# Patient Record
Sex: MALE | Race: WHITE | HISPANIC OR LATINO | Employment: FULL TIME | URBAN - METROPOLITAN AREA
[De-identification: names, ages, dates, MRNs, and addresses within clinical notes are randomized per-mention and may not be internally consistent; named-entity substitution may affect disease eponyms.]

---

## 2020-11-12 ENCOUNTER — OFFICE VISIT (OUTPATIENT)
Dept: URGENT CARE | Facility: PHYSICIAN GROUP | Age: 45
End: 2020-11-12

## 2020-11-12 ENCOUNTER — HOSPITAL ENCOUNTER (OUTPATIENT)
Dept: RADIOLOGY | Facility: MEDICAL CENTER | Age: 45
End: 2020-11-12
Attending: PHYSICIAN ASSISTANT

## 2020-11-12 VITALS
DIASTOLIC BLOOD PRESSURE: 90 MMHG | HEIGHT: 66 IN | WEIGHT: 155 LBS | BODY MASS INDEX: 24.91 KG/M2 | RESPIRATION RATE: 17 BRPM | HEART RATE: 91 BPM | OXYGEN SATURATION: 97 % | SYSTOLIC BLOOD PRESSURE: 140 MMHG | TEMPERATURE: 97.9 F

## 2020-11-12 DIAGNOSIS — Z85.47 PERSONAL HISTORY OF MALIGNANT NEOPLASM OF TESTIS: ICD-10-CM

## 2020-11-12 DIAGNOSIS — N50.89 SCROTAL SWELLING: ICD-10-CM

## 2020-11-12 DIAGNOSIS — N50.89 MASS OF RIGHT TESTICLE: ICD-10-CM

## 2020-11-12 PROCEDURE — 99204 OFFICE O/P NEW MOD 45 MIN: CPT | Performed by: PHYSICIAN ASSISTANT

## 2020-11-12 PROCEDURE — 76870 US EXAM SCROTUM: CPT

## 2020-11-12 ASSESSMENT — ENCOUNTER SYMPTOMS
SHORTNESS OF BREATH: 0
COUGH: 0
FLANK PAIN: 0
VOMITING: 0
AFFECTED TESTICLE: 1
ABDOMINAL PAIN: 0
FEVER: 0
PALPITATIONS: 0
CHILLS: 0
BACK PAIN: 0
NAUSEA: 0

## 2020-11-12 ASSESSMENT — PAIN SCALES - GENERAL: PAINLEVEL: NO PAIN

## 2020-11-12 NOTE — PROGRESS NOTES
"Subjective:   Charanjit Morin is a 44 y.o. male who presents for Other (testicle lump right side x 2 months)      Testicle Pain  This is a new problem. Episode onset: 2 months ago. The problem occurs constantly. Pertinent negatives include no abdominal pain, chest pain, chills, coughing, fever, nausea or vomiting. Associated symptoms comments: Swelling  . Nothing aggravates the symptoms. He has tried nothing for the symptoms.       Review of Systems   Constitutional: Negative for chills and fever.   Respiratory: Negative for cough and shortness of breath.    Cardiovascular: Negative for chest pain and palpitations.   Gastrointestinal: Negative for abdominal pain, nausea and vomiting.   Genitourinary: Negative for dysuria, flank pain, frequency, hematuria and urgency.        Scrotal swelling     Musculoskeletal: Negative for back pain.   All other systems reviewed and are negative.      Medications:    • This patient does not have an active medication from one of the medication groupers.    Allergies: Patient has no known allergies.    Problem List: Charanjit Morin does not have a problem list on file.    Surgical History:  No past surgical history on file.    Past Social Hx: Charanjit Morin  reports that he has been smoking. He has never used smokeless tobacco.     Past Family Hx:  Charanjit Morin family history is not on file.     Problem list, medications, and allergies reviewed by myself today in Epic.     Objective:     Blood Pressure 140/90   Pulse 91   Temperature 36.6 °C (97.9 °F)   Respiration 17   Height 1.676 m (5' 6\")   Weight 70.3 kg (155 lb)   Oxygen Saturation 97%   Body Mass Index 25.02 kg/m²     Physical Exam  Vitals signs reviewed.   Constitutional:       General: He is not in acute distress.     Appearance: He is well-developed. He is not ill-appearing, toxic-appearing or diaphoretic.   Cardiovascular:      Rate and Rhythm: Normal rate and regular rhythm.      Heart sounds: Normal heart sounds. "   Pulmonary:      Effort: Pulmonary effort is normal.      Breath sounds: Normal breath sounds.   Abdominal:      General: Bowel sounds are normal. There is no distension.      Palpations: Abdomen is soft. There is no mass.      Tenderness: There is no abdominal tenderness. There is no guarding or rebound.      Hernia: No hernia is present.   Genitourinary:     Penis: Normal. No phimosis, paraphimosis, erythema or tenderness.       Testes: Cremasteric reflex is present.         Right: Mass not present. Right testis is descended.         Left: Mass not present. Left testis is descended.      Comments: Neg Prehn's sign.  No bell-clapper deformity.  Soft testicle to palpation.  Edema of the right testicle  Skin:     Findings: No erythema or rash.   Neurological:      Mental Status: He is alert and oriented to person, place, and time.   Psychiatric:         Behavior: Behavior normal.         Thought Content: Thought content normal.         Judgment: Judgment normal.     11/12/2020 4:17 PM     HISTORY/REASON FOR EXAM: Left orchiectomy several years ago due to malignancy. Growing mass and enlargement of the right testicle for 2 months.     TECHNIQUE/EXAM DESCRIPTION:  Real-time sonography of the scrotum was performed with gray-scale, color and duplex Doppler imaging.     COMPARISON: None     FINDINGS:  The right testicle is globular in shape with a heterogeneous hypoechoic mass centrally containing some internal calcification measuring 2.9 x 2.2 x 1.3 cm. This appears to extrude about the medial aspect of the right mid testicle which is lobulated in   appearance. It is hypervascular.     The right testis measures 3.15 cm x 1.88 cm x 3.43 cm.  The left testis is surgically absent.     The right epididymis is normal with the head measuring 7 mm.     There is a right hydrocele.     No varicocele is detected.     IMPRESSION:     1.  There is a heterogeneous hypervascular globular mass within a lobulated abnormally shaped  right testicle. This is suspicious for malignancy.    Assessment/Plan:     Medical Decision Making/Comments     Pain is a 44-year-old malewho presents for evaluation of testicular pain.  Pt states for the last 2 months he has had a rate testicular swelling.  History of testicular cancer 20 years ago with removal of the left testicle. 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 Pt denies red flag symptoms of trauma, N/V, abominal pain.  Pt is sexually active.  Vital sign normal.  Testicular exam shows edema of the right testicle.  Soft  firm testicle with no bell clapper deformity seen.  Cremaster reflex is present and negative Prehns sign.  US shows mass suspicious for malignancy.  Urgent referral sent to urology.    Diagnosis differential includes but not limited to: vericocele, hydrocele spermatocele, epididymitis, hernia, soft tissue infection, testicular torsion, mass/malignancy,      Diagnosis and associated orders     1. Scrotal swelling  [id number]    REFERRAL TO UROLOGY              Differential diagnosis, natural history, supportive care, and indications for immediate follow-up discussed.    Advised the patient to follow-up with the primary care physician for recheck, reevaluation, and consideration of further management.    Please note that this dictation was created using voice recognition software. I have made a reasonable attempt to correct obvious errors, but I expect that there are errors of grammar and possibly content that I did not discover before finalizing the note.

## 2020-11-12 NOTE — PATIENT INSTRUCTIONS
Hydrocele, Adult  A hydrocele is a collection of fluid in the loose pouch of skin that holds the testicles (scrotum). This may happen because:  · The amount of fluid produced in the scrotum is not absorbed by the rest of the body.  · Fluid from the abdomen fills the scrotum. Normally, the testicles develop in the abdomen then move (drop) into to the scrotum before birth. The tube that the testicles travel through usually closes after the testicles drop. If the tube does not close, fluid from the abdomen can fill the scrotum. This is less common in adults.  What are the causes?  The cause of a hydrocele in adults is usually not known. However, it may be caused by:  · An injury to the scrotum.  · An infection (epididymitis).  · Decreased blood flow to the scrotum.  · Twisting of a testicle (testicular torsion).  · A birth defect.  · A tumor or cancer of the testicle.  What are the signs or symptoms?  A hydrocele feels like a water-filled balloon. It may also feel heavy. Other symptoms include:  · Swelling of the scrotum. The swelling may decrease when you lie down. You may also notice more swelling at night than in the morning.  · Swelling of the groin.  · Mild discomfort in the scrotum.  · Pain. This can develop if the hydrocele was caused by infection or twisting. The larger the hydrocele, the more likely you are to have pain.  How is this diagnosed?  This condition may be diagnosed based on:  · Physical exam.  · Medical history.  You may also have other tests, including:  · Imaging tests, such as ultrasound.  · Blood or urine tests.  How is this treated?  Most hydroceles go away on their own. If you have no discomfort or pain, your health care provider may suggest close monitoring of your condition (called watch and wait or watchful waiting) until the condition goes away or symptoms develop. If treatment is needed, it may include:  · Treating an underlying condition. This may include using an antibiotic medicine to  treat an infection.  · Surgery to stop fluid from collecting in the scrotum.  · Surgery to drain the fluid. Options include:  ? Needle aspiration. A needle is used to drain fluid. However, the fluid buildup will come back quickly.  ? Hydrocelectomy. For this procedure, an incision is made in the scrotum to remove the fluid sac.  Follow these instructions at home:  · Watch the hydrocele for any changes.  · Take over-the-counter and prescription medicines only as told by your health care provider.  · If you were prescribed an antibiotic medicine, use it as told by your health care provider. Do not stop taking the antibiotic even if you start to feel better.  · Keep all follow-up visits as told by your health care provider. This is important.  Contact a health care provider if:  · You notice any changes in the hydrocele.  · The swelling in your scrotum or groin gets worse.  · The hydrocele becomes red, firm, painful, or tender to the touch.  · You have a fever.  Get help right away if you:  · Develop a lot of pain, or your pain becomes worse.  Summary  · A hydrocele is a collection of fluid in the loose pouch of skin that holds the testicles (scrotum).  · Hydroceles can cause swelling, discomfort, and sometimes pain.  · In adults, the cause of a hydrocele usually is not known. However, it is sometimes caused by an infection or a rotation and twisting of the scrotum.  · Treatment is usually not needed. Hydroceles often go away on their own. If a hydrocele causes pain, treatment may be given to ease the pain.  This information is not intended to replace advice given to you by your health care provider. Make sure you discuss any questions you have with your health care provider.  Document Released: 06/07/2011 Document Revised: 12/29/2018 Document Reviewed: 12/29/2018  [employer] Patient Education © 2020 [employer]

## 2020-11-17 ENCOUNTER — HOSPITAL ENCOUNTER (OUTPATIENT)
Dept: RADIOLOGY | Facility: MEDICAL CENTER | Age: 45
End: 2020-11-17
Attending: UROLOGY

## 2020-11-17 ENCOUNTER — HOSPITAL ENCOUNTER (OUTPATIENT)
Dept: LAB | Facility: MEDICAL CENTER | Age: 45
End: 2020-11-17
Attending: UROLOGY

## 2020-11-17 DIAGNOSIS — N50.89 CHYLOCELE OF TUNICA VAGINALIS: ICD-10-CM

## 2020-11-17 LAB
ALBUMIN SERPL BCP-MCNC: 4.2 G/DL (ref 3.2–4.9)
ALBUMIN/GLOB SERPL: 1.3 G/DL
ALP SERPL-CCNC: 144 U/L (ref 30–99)
ALT SERPL-CCNC: 63 U/L (ref 2–50)
ANION GAP SERPL CALC-SCNC: 10 MMOL/L (ref 7–16)
AST SERPL-CCNC: 45 U/L (ref 12–45)
B-HCG SERPL-ACNC: 202 MIU/ML (ref 0–5)
BASOPHILS # BLD AUTO: 0.4 % (ref 0–1.8)
BASOPHILS # BLD: 0.05 K/UL (ref 0–0.12)
BILIRUB SERPL-MCNC: 0.2 MG/DL (ref 0.1–1.5)
BUN SERPL-MCNC: 12 MG/DL (ref 8–22)
CALCIUM SERPL-MCNC: 9.4 MG/DL (ref 8.5–10.5)
CHLORIDE SERPL-SCNC: 97 MMOL/L (ref 96–112)
CO2 SERPL-SCNC: 22 MMOL/L (ref 20–33)
CREAT SERPL-MCNC: 0.9 MG/DL (ref 0.5–1.4)
EOSINOPHIL # BLD AUTO: 0.08 K/UL (ref 0–0.51)
EOSINOPHIL NFR BLD: 0.7 % (ref 0–6.9)
ERYTHROCYTE [DISTWIDTH] IN BLOOD BY AUTOMATED COUNT: 51.2 FL (ref 35.9–50)
GLOBULIN SER CALC-MCNC: 3.3 G/DL (ref 1.9–3.5)
GLUCOSE SERPL-MCNC: 101 MG/DL (ref 65–99)
HCT VFR BLD AUTO: 44.6 % (ref 42–52)
HGB BLD-MCNC: 15 G/DL (ref 14–18)
IMM GRANULOCYTES # BLD AUTO: 0.05 K/UL (ref 0–0.11)
IMM GRANULOCYTES NFR BLD AUTO: 0.4 % (ref 0–0.9)
LDH SERPL L TO P-CCNC: 199 U/L (ref 107–266)
LYMPHOCYTES # BLD AUTO: 2.95 K/UL (ref 1–4.8)
LYMPHOCYTES NFR BLD: 24.1 % (ref 22–41)
MCH RBC QN AUTO: 32.7 PG (ref 27–33)
MCHC RBC AUTO-ENTMCNC: 33.6 G/DL (ref 33.7–35.3)
MCV RBC AUTO: 97.2 FL (ref 81.4–97.8)
MONOCYTES # BLD AUTO: 1 K/UL (ref 0–0.85)
MONOCYTES NFR BLD AUTO: 8.2 % (ref 0–13.4)
NEUTROPHILS # BLD AUTO: 8.13 K/UL (ref 1.82–7.42)
NEUTROPHILS NFR BLD: 66.2 % (ref 44–72)
NRBC # BLD AUTO: 0 K/UL
NRBC BLD-RTO: 0 /100 WBC
PLATELET # BLD AUTO: 371 K/UL (ref 164–446)
PMV BLD AUTO: 11.8 FL (ref 9–12.9)
POTASSIUM SERPL-SCNC: 3.5 MMOL/L (ref 3.6–5.5)
PROT SERPL-MCNC: 7.5 G/DL (ref 6–8.2)
RBC # BLD AUTO: 4.59 M/UL (ref 4.7–6.1)
SODIUM SERPL-SCNC: 129 MMOL/L (ref 135–145)
WBC # BLD AUTO: 12.3 K/UL (ref 4.8–10.8)

## 2020-11-17 PROCEDURE — 80053 COMPREHEN METABOLIC PANEL: CPT

## 2020-11-17 PROCEDURE — 700117 HCHG RX CONTRAST REV CODE 255: Performed by: UROLOGY

## 2020-11-17 PROCEDURE — 36415 COLL VENOUS BLD VENIPUNCTURE: CPT

## 2020-11-17 PROCEDURE — 71260 CT THORAX DX C+: CPT

## 2020-11-17 PROCEDURE — 82105 ALPHA-FETOPROTEIN SERUM: CPT

## 2020-11-17 PROCEDURE — 83615 LACTATE (LD) (LDH) ENZYME: CPT

## 2020-11-17 PROCEDURE — 74178 CT ABD&PLV WO CNTR FLWD CNTR: CPT

## 2020-11-17 PROCEDURE — 84702 CHORIONIC GONADOTROPIN TEST: CPT

## 2020-11-17 PROCEDURE — 85025 COMPLETE CBC W/AUTO DIFF WBC: CPT

## 2020-11-17 RX ADMIN — IOHEXOL 25 ML: 240 INJECTION, SOLUTION INTRATHECAL; INTRAVASCULAR; INTRAVENOUS; ORAL at 15:17

## 2020-11-17 RX ADMIN — IOHEXOL 100 ML: 350 INJECTION, SOLUTION INTRAVENOUS at 16:15

## 2020-11-19 LAB — AFP-TM SERPL-MCNC: 6 NG/ML (ref 0–9)

## 2020-11-20 ENCOUNTER — APPOINTMENT (OUTPATIENT)
Dept: ADMISSIONS | Facility: MEDICAL CENTER | Age: 45
End: 2020-11-20
Attending: UROLOGY

## 2020-11-20 ENCOUNTER — PRE-ADMISSION TESTING (OUTPATIENT)
Dept: ADMISSIONS | Facility: MEDICAL CENTER | Age: 45
End: 2020-11-20
Attending: UROLOGY

## 2020-11-20 DIAGNOSIS — Z01.812 PRE-OPERATIVE LABORATORY EXAMINATION: ICD-10-CM

## 2020-11-20 LAB — COVID ORDER STATUS COVID19: NORMAL

## 2020-11-20 PROCEDURE — U0003 INFECTIOUS AGENT DETECTION BY NUCLEIC ACID (DNA OR RNA); SEVERE ACUTE RESPIRATORY SYNDROME CORONAVIRUS 2 (SARS-COV-2) (CORONAVIRUS DISEASE [COVID-19]), AMPLIFIED PROBE TECHNIQUE, MAKING USE OF HIGH THROUGHPUT TECHNOLOGIES AS DESCRIBED BY CMS-2020-01-R: HCPCS

## 2020-11-20 PROCEDURE — C9803 HOPD COVID-19 SPEC COLLECT: HCPCS

## 2020-11-21 LAB
SARS-COV-2 RNA RESP QL NAA+PROBE: NOTDETECTED
SPECIMEN SOURCE: NORMAL

## 2020-11-22 ENCOUNTER — HOSPITAL ENCOUNTER (OUTPATIENT)
Facility: MEDICAL CENTER | Age: 45
End: 2020-11-22
Attending: UROLOGY | Admitting: UROLOGY

## 2020-11-22 ENCOUNTER — ANESTHESIA (OUTPATIENT)
Dept: SURGERY | Facility: MEDICAL CENTER | Age: 45
End: 2020-11-22

## 2020-11-22 ENCOUNTER — ANESTHESIA EVENT (OUTPATIENT)
Dept: SURGERY | Facility: MEDICAL CENTER | Age: 45
End: 2020-11-22

## 2020-11-22 VITALS
BODY MASS INDEX: 24.84 KG/M2 | HEART RATE: 78 BPM | TEMPERATURE: 97.6 F | DIASTOLIC BLOOD PRESSURE: 80 MMHG | RESPIRATION RATE: 14 BRPM | SYSTOLIC BLOOD PRESSURE: 118 MMHG | OXYGEN SATURATION: 94 % | WEIGHT: 154.54 LBS | HEIGHT: 66 IN

## 2020-11-22 DIAGNOSIS — C62.11 MALIGNANT NEOPLASM OF DESCENDED RIGHT TESTIS (HCC): Primary | ICD-10-CM

## 2020-11-22 PROBLEM — C62.90 MALIGNANT TUMOR OF TESTIS (HCC): Status: ACTIVE | Noted: 2020-11-22

## 2020-11-22 PROCEDURE — 700111 HCHG RX REV CODE 636 W/ 250 OVERRIDE (IP): Performed by: UROLOGY

## 2020-11-22 PROCEDURE — A9270 NON-COVERED ITEM OR SERVICE: HCPCS | Performed by: UROLOGY

## 2020-11-22 PROCEDURE — 700105 HCHG RX REV CODE 258: Performed by: UROLOGY

## 2020-11-22 PROCEDURE — 700111 HCHG RX REV CODE 636 W/ 250 OVERRIDE (IP): Performed by: ANESTHESIOLOGY

## 2020-11-22 PROCEDURE — 160025 RECOVERY II MINUTES (STATS): Performed by: UROLOGY

## 2020-11-22 PROCEDURE — 700102 HCHG RX REV CODE 250 W/ 637 OVERRIDE(OP): Performed by: UROLOGY

## 2020-11-22 PROCEDURE — 160036 HCHG PACU - EA ADDL 30 MINS PHASE I: Performed by: UROLOGY

## 2020-11-22 PROCEDURE — 160009 HCHG ANES TIME/MIN: Performed by: UROLOGY

## 2020-11-22 PROCEDURE — 501838 HCHG SUTURE GENERAL: Performed by: UROLOGY

## 2020-11-22 PROCEDURE — 160028 HCHG SURGERY MINUTES - 1ST 30 MINS LEVEL 3: Performed by: UROLOGY

## 2020-11-22 PROCEDURE — A9270 NON-COVERED ITEM OR SERVICE: HCPCS | Performed by: ANESTHESIOLOGY

## 2020-11-22 PROCEDURE — 88341 IMHCHEM/IMCYTCHM EA ADD ANTB: CPT | Mod: 91

## 2020-11-22 PROCEDURE — 500383 HCHG DRAIN, PENROSE 3/8X12: Performed by: UROLOGY

## 2020-11-22 PROCEDURE — 700101 HCHG RX REV CODE 250: Performed by: ANESTHESIOLOGY

## 2020-11-22 PROCEDURE — 160002 HCHG RECOVERY MINUTES (STAT): Performed by: UROLOGY

## 2020-11-22 PROCEDURE — 88309 TISSUE EXAM BY PATHOLOGIST: CPT

## 2020-11-22 PROCEDURE — 160039 HCHG SURGERY MINUTES - EA ADDL 1 MIN LEVEL 3: Performed by: UROLOGY

## 2020-11-22 PROCEDURE — 700102 HCHG RX REV CODE 250 W/ 637 OVERRIDE(OP): Performed by: ANESTHESIOLOGY

## 2020-11-22 PROCEDURE — 700101 HCHG RX REV CODE 250: Performed by: UROLOGY

## 2020-11-22 PROCEDURE — 500002 HCHG ADHESIVE, DERMABOND: Performed by: UROLOGY

## 2020-11-22 PROCEDURE — A6403 STERILE GAUZE>16 <= 48 SQ IN: HCPCS | Performed by: UROLOGY

## 2020-11-22 PROCEDURE — 160046 HCHG PACU - 1ST 60 MINS PHASE II: Performed by: UROLOGY

## 2020-11-22 PROCEDURE — 160035 HCHG PACU - 1ST 60 MINS PHASE I: Performed by: UROLOGY

## 2020-11-22 PROCEDURE — 88342 IMHCHEM/IMCYTCHM 1ST ANTB: CPT

## 2020-11-22 PROCEDURE — 160048 HCHG OR STATISTICAL LEVEL 1-5: Performed by: UROLOGY

## 2020-11-22 RX ORDER — MIDAZOLAM HYDROCHLORIDE 1 MG/ML
INJECTION INTRAMUSCULAR; INTRAVENOUS PRN
Status: DISCONTINUED | OUTPATIENT
Start: 2020-11-22 | End: 2020-11-22 | Stop reason: SURG

## 2020-11-22 RX ORDER — CEFAZOLIN SODIUM 1 G/3ML
INJECTION, POWDER, FOR SOLUTION INTRAMUSCULAR; INTRAVENOUS PRN
Status: DISCONTINUED | OUTPATIENT
Start: 2020-11-22 | End: 2020-11-22 | Stop reason: SURG

## 2020-11-22 RX ORDER — DEXAMETHASONE SODIUM PHOSPHATE 4 MG/ML
INJECTION, SOLUTION INTRA-ARTICULAR; INTRALESIONAL; INTRAMUSCULAR; INTRAVENOUS; SOFT TISSUE PRN
Status: DISCONTINUED | OUTPATIENT
Start: 2020-11-22 | End: 2020-11-22 | Stop reason: SURG

## 2020-11-22 RX ORDER — DOCUSATE SODIUM 100 MG/1
100 CAPSULE, LIQUID FILLED ORAL 2 TIMES DAILY
Qty: 30 CAP | Refills: 0 | COMMUNITY
End: 2020-12-28

## 2020-11-22 RX ORDER — DIPHENHYDRAMINE HYDROCHLORIDE 50 MG/ML
12.5 INJECTION INTRAMUSCULAR; INTRAVENOUS
Status: DISCONTINUED | OUTPATIENT
Start: 2020-11-22 | End: 2020-11-22 | Stop reason: HOSPADM

## 2020-11-22 RX ORDER — HYDROMORPHONE HYDROCHLORIDE 1 MG/ML
0.1 INJECTION, SOLUTION INTRAMUSCULAR; INTRAVENOUS; SUBCUTANEOUS
Status: DISCONTINUED | OUTPATIENT
Start: 2020-11-22 | End: 2020-11-22 | Stop reason: HOSPADM

## 2020-11-22 RX ORDER — KETOROLAC TROMETHAMINE 30 MG/ML
INJECTION, SOLUTION INTRAMUSCULAR; INTRAVENOUS PRN
Status: DISCONTINUED | OUTPATIENT
Start: 2020-11-22 | End: 2020-11-22 | Stop reason: SURG

## 2020-11-22 RX ORDER — OXYCODONE HCL 5 MG/5 ML
10 SOLUTION, ORAL ORAL
Status: COMPLETED | OUTPATIENT
Start: 2020-11-22 | End: 2020-11-22

## 2020-11-22 RX ORDER — LORAZEPAM 2 MG/ML
0.5 INJECTION INTRAMUSCULAR
Status: DISCONTINUED | OUTPATIENT
Start: 2020-11-22 | End: 2020-11-22 | Stop reason: HOSPADM

## 2020-11-22 RX ORDER — LABETALOL HYDROCHLORIDE 5 MG/ML
5 INJECTION, SOLUTION INTRAVENOUS
Status: DISCONTINUED | OUTPATIENT
Start: 2020-11-22 | End: 2020-11-22 | Stop reason: HOSPADM

## 2020-11-22 RX ORDER — HYDROMORPHONE HYDROCHLORIDE 1 MG/ML
0.4 INJECTION, SOLUTION INTRAMUSCULAR; INTRAVENOUS; SUBCUTANEOUS
Status: DISCONTINUED | OUTPATIENT
Start: 2020-11-22 | End: 2020-11-22 | Stop reason: HOSPADM

## 2020-11-22 RX ORDER — ONDANSETRON 2 MG/ML
INJECTION INTRAMUSCULAR; INTRAVENOUS PRN
Status: DISCONTINUED | OUTPATIENT
Start: 2020-11-22 | End: 2020-11-22 | Stop reason: SURG

## 2020-11-22 RX ORDER — SODIUM CHLORIDE, SODIUM LACTATE, POTASSIUM CHLORIDE, CALCIUM CHLORIDE 600; 310; 30; 20 MG/100ML; MG/100ML; MG/100ML; MG/100ML
INJECTION, SOLUTION INTRAVENOUS CONTINUOUS
Status: DISCONTINUED | OUTPATIENT
Start: 2020-11-22 | End: 2020-11-22 | Stop reason: HOSPADM

## 2020-11-22 RX ORDER — HYDROMORPHONE HYDROCHLORIDE 1 MG/ML
0.2 INJECTION, SOLUTION INTRAMUSCULAR; INTRAVENOUS; SUBCUTANEOUS
Status: DISCONTINUED | OUTPATIENT
Start: 2020-11-22 | End: 2020-11-22 | Stop reason: HOSPADM

## 2020-11-22 RX ORDER — LIDOCAINE HYDROCHLORIDE 20 MG/ML
INJECTION, SOLUTION EPIDURAL; INFILTRATION; INTRACAUDAL; PERINEURAL PRN
Status: DISCONTINUED | OUTPATIENT
Start: 2020-11-22 | End: 2020-11-22 | Stop reason: SURG

## 2020-11-22 RX ORDER — BUPIVACAINE HYDROCHLORIDE 2.5 MG/ML
INJECTION, SOLUTION EPIDURAL; INFILTRATION; INTRACAUDAL
Status: DISCONTINUED | OUTPATIENT
Start: 2020-11-22 | End: 2020-11-22 | Stop reason: HOSPADM

## 2020-11-22 RX ORDER — ONDANSETRON 2 MG/ML
4 INJECTION INTRAMUSCULAR; INTRAVENOUS
Status: DISCONTINUED | OUTPATIENT
Start: 2020-11-22 | End: 2020-11-22 | Stop reason: HOSPADM

## 2020-11-22 RX ORDER — POLYETHYLENE GLYCOL 3350 17 G/17G
17 POWDER, FOR SOLUTION ORAL DAILY
Qty: 14 EACH | Refills: 0 | COMMUNITY
End: 2020-12-28

## 2020-11-22 RX ORDER — OXYCODONE HCL 5 MG/5 ML
5 SOLUTION, ORAL ORAL
Status: COMPLETED | OUTPATIENT
Start: 2020-11-22 | End: 2020-11-22

## 2020-11-22 RX ORDER — OXYCODONE HYDROCHLORIDE 5 MG/1
5-10 TABLET ORAL EVERY 6 HOURS PRN
Qty: 15 TAB | Refills: 0 | Status: SHIPPED | OUTPATIENT
Start: 2020-11-22 | End: 2020-11-27

## 2020-11-22 RX ORDER — MEPERIDINE HYDROCHLORIDE 25 MG/ML
12.5 INJECTION INTRAMUSCULAR; INTRAVENOUS; SUBCUTANEOUS
Status: DISCONTINUED | OUTPATIENT
Start: 2020-11-22 | End: 2020-11-22 | Stop reason: HOSPADM

## 2020-11-22 RX ORDER — HALOPERIDOL 5 MG/ML
1 INJECTION INTRAMUSCULAR
Status: DISCONTINUED | OUTPATIENT
Start: 2020-11-22 | End: 2020-11-22 | Stop reason: HOSPADM

## 2020-11-22 RX ADMIN — LIDOCAINE HYDROCHLORIDE 70 MG: 20 INJECTION, SOLUTION EPIDURAL; INFILTRATION; INTRACAUDAL at 08:09

## 2020-11-22 RX ADMIN — SODIUM CHLORIDE, POTASSIUM CHLORIDE, SODIUM LACTATE AND CALCIUM CHLORIDE: 600; 310; 30; 20 INJECTION, SOLUTION INTRAVENOUS at 06:58

## 2020-11-22 RX ADMIN — LIDOCAINE HYDROCHLORIDE 0.5 ML: 10 INJECTION, SOLUTION EPIDURAL; INFILTRATION; INTRACAUDAL; PERINEURAL at 06:58

## 2020-11-22 RX ADMIN — MIDAZOLAM HYDROCHLORIDE 2 MG: 1 INJECTION, SOLUTION INTRAMUSCULAR; INTRAVENOUS at 08:05

## 2020-11-22 RX ADMIN — OXYCODONE HYDROCHLORIDE 10 MG: 5 SOLUTION ORAL at 09:30

## 2020-11-22 RX ADMIN — DEXAMETHASONE SODIUM PHOSPHATE 8 MG: 4 INJECTION, SOLUTION INTRA-ARTICULAR; INTRALESIONAL; INTRAMUSCULAR; INTRAVENOUS; SOFT TISSUE at 08:13

## 2020-11-22 RX ADMIN — FENTANYL CITRATE 50 MCG: 50 INJECTION, SOLUTION INTRAMUSCULAR; INTRAVENOUS at 08:18

## 2020-11-22 RX ADMIN — CEFAZOLIN 2 G: 330 INJECTION, POWDER, FOR SOLUTION INTRAMUSCULAR; INTRAVENOUS at 08:13

## 2020-11-22 RX ADMIN — PROPOFOL 200 MG: 10 INJECTION, EMULSION INTRAVENOUS at 08:09

## 2020-11-22 RX ADMIN — ONDANSETRON 4 MG: 2 INJECTION INTRAMUSCULAR; INTRAVENOUS at 09:04

## 2020-11-22 RX ADMIN — POVIDONE IODINE 15 ML: 100 SOLUTION TOPICAL at 06:58

## 2020-11-22 RX ADMIN — FENTANYL CITRATE 25 MCG: 50 INJECTION, SOLUTION INTRAMUSCULAR; INTRAVENOUS at 09:35

## 2020-11-22 RX ADMIN — FENTANYL CITRATE 50 MCG: 50 INJECTION, SOLUTION INTRAMUSCULAR; INTRAVENOUS at 08:27

## 2020-11-22 RX ADMIN — KETOROLAC TROMETHAMINE 30 MG: 30 INJECTION, SOLUTION INTRAMUSCULAR at 09:04

## 2020-11-22 ASSESSMENT — PAIN DESCRIPTION - PAIN TYPE
TYPE: SURGICAL PAIN

## 2020-11-22 ASSESSMENT — PAIN SCALES - GENERAL: PAIN_LEVEL: 3

## 2020-11-22 ASSESSMENT — FIBROSIS 4 INDEX: FIB4 SCORE: 0.67

## 2020-11-22 NOTE — ANESTHESIA POSTPROCEDURE EVALUATION
Patient: Charanjit Morin    Procedure Summary     Date: 11/22/20 Room / Location: [place] [address] / SURGERY [facility]    Anesthesia Start: 0805 Anesthesia Stop: 0923    Procedure: ORCHIECTOMY - INGUINAL RADICAL (Right Scrotum) Diagnosis: (MALIGNANT TUMOR OF TESTIS)    Surgeons: Arturo Harvey M.D. Responsible Provider: Monica Arango M.D.    Anesthesia Type: general ASA Status: 2          Final Anesthesia Type: general  Last vitals  BP   Blood Pressure: 118/80, NIBP: 149/49    Temp   36.4 °C (97.6 °F)    Pulse   Pulse: 78   Resp   14    SpO2   94 %      Anesthesia Post Evaluation    Patient location during evaluation: PACU  Patient participation: complete - patient participated  Level of consciousness: awake and alert  Pain score: 3    Airway patency: patent  Anesthetic complications: no  Cardiovascular status: adequate  Respiratory status: acceptable  Hydration status: acceptable    PONV: none           Nurse Pain Score: 3 (NPRS)

## 2020-11-22 NOTE — PROGRESS NOTES
Patient arrived in phase II, A&Ox4, pain is 0/10, no complaints of nausea, incision to right groin closed with dermabond, well approximated, no drainage.    Patient verbalizes readiness for discharge.  Prescription sent to pharmacy by MD.  Instructions, medication, and follow up appointment reviewed with patient and significant other, understanding verbalized.  Discharge instructions signed. IV discontinued. Patient verbalized all belongings had been returned.  Discharged via wheelchair.  
Unknown if ever smoked

## 2020-11-22 NOTE — OR NURSING
"Pt. verbalized\" manageable\"pain level.Right groins incision remains clear from any bleeding,ice pack inplaced.Dr. Harvey talked to pt. Pt's. S.O. was updated.  "

## 2020-11-22 NOTE — ANESTHESIA TIME REPORT
Anesthesia Start and Stop Event Times     Date Time Event    11/22/2020 0754 Ready for Procedure     0805 Anesthesia Start     0923 Anesthesia Stop        Responsible Staff  11/22/20    Name Role Begin End    Monica Arango M.D. Anesth 0805 0923        Preop Diagnosis (Free Text):  Pre-op Diagnosis     MALIGNANT TUMOR OF TESTIS        Preop Diagnosis (Codes):    Post op Diagnosis  Testicular cancer (HCC)      Premium Reason  E. Weekend    Comments:

## 2020-11-22 NOTE — ANESTHESIA PROCEDURE NOTES
Airway    Date/Time: 11/22/2020 8:10 AM  Performed by: Monica Arango M.D.  Authorized by: Monica Arango M.D.     Location:  OR  Urgency:  Elective  Indications for Airway Management:  Anesthesia      Spontaneous Ventilation: absent    Sedation Level:  Deep  Preoxygenated: Yes    Mask Difficulty Assessment:  0 - not attempted  Final Airway Type:  Supraglottic airway  Final Supraglottic Airway:  Standard LMA    SGA Size:  4  Number of Attempts at Approach:  1

## 2020-11-22 NOTE — OP REPORT
[facility] [place] Operative Report    Pre-operative Diagnosis: 1. Right Testciular mass  2. History of Left testicular cancer s/p orchiectomy and radiation   Post-operative Diagnosis: Same as above   Procedure: RIGHT radical orchiectomy, inguinal approach   Surgeon: Arturo Harvey M.D.   Assistant: None   Anesthesia: General, LMA ; Marianne Arango MD   Estimated Blood Loss: minimal   IV fluids 500 cc crystalloid   Specimens: 1. Right spermatic cord and testicle for surgical pathology   Drains: None   Complications/Condition: None, Stable, procedure well tolerated    Wound class I Clean   Disposition:  PACU, home after recover, f/u 2 weeks to review pathology, scrotal support and jock strap for next several weeks.    Findings: Right scrotal mass somewhat firm, normal inguinal anatomy     Indications for procedure:  The patient is a 44 y.o. male with newly diagnosed 2.9x1.3cm scrotal mass on US and exam. He had a CT scan showing indeterminate small liver lesions without any evidence of other metastatic disease in the chest or elsewhere.. His AFP and LDH were normal, however, his beta hCG was elevated at 202.  After lengthy discussion regarding therapy for probable testicular cancer, the patient opted to undergo radical orchiectomy. The risks, benefits, treatment options, potential complications and expected outcomes were discussed with the patient. The patient concurred with the proposed plan, giving informed consent.     Procedure Details:  The patient was taken to the Operating Room, identified as Charanjit Morin and the procedure verified as RIGHT radical inguinal orchiectomy, and a time Out was held and the above information confirmed. SCD's were placed for DVT prophylaxis and Ancef was given for perioperative antibiotics prophylaxis. In the supine position general anesthesia was induced and the genital and inguinal region of affected side were shaved, prepped and draped in a sterile fashion.     We first identified  anatomic landmarks including the RIGHT pubic tubercle and anterior superior iliac spine to identify the path of spermatic cord.  Starting at about 1-2cm above and lateral to pubic tubercle, a 5cm oblique inguinal incision was created along line of Langerhans towards the  anterior superior iliac spine.  The incision was then carried down to the external oblique fascia. The external oblique fascia was divided in the direction of it fibers through the external ring medially and carried out lateral towards the internal ring. The medial and lateral flaps were elevated and bluntly dissected. The  ilioinguinal nerve was  directly visualized and was preserved. Blunt dissection at the pubic tubercle was used to elevate all cord structures. The cord was then encircled with a Penrose drain wrapped twice around cord structures.    The testicle was then delivered into field with gentle pressure at base of hemiscrotum, and gentle traction on the spermatic cord. The hemiscrotum was then invaginated exposing gubernaculum which was carefully incised using electrocautery, taking care to not injure the scrotal skin and using meticulous hemostasis.      Next we dissected and freed the cord taking down some cremasteric muscle fibers towards the proximal internal inguinal ring to the point where retroperitoneal fat was visualized. Further cremasteric muscle fibers were transected with electrocautery, thus skeletonizing the cord.  After double clamping the vas deferens, we then performed high ligation using 2-0 silk stick tie, and silk tie more proximally.  After double clamping of the remaining cord and its vessels, we further ligated the cord and its vessels similarly with an 0 silks sutures leaving long tails on the proximal cord stump.  We then re-examined the dartos fascia and established excellent hemostasis. Wound was then thoroughly irrigated and the external oblique was reapproximated using absorbable suture. We then performed a  ilioinguinal nerve block.  Glen's fascia was reapproximated using absorbable sutures, and the skin incision closed using 4-0 absorbable suture in a running subcuticular fashion with DermaBond applied to incision.    Instrument, sponge, and needle counts were correct prior to abdominal closure and at the conclusion of the case. The patient tolerated the procedure well and was taken to the PACU in stable condition.       Arturo Harvey MD  [facility] [id number]  [place] [address]  [phone]

## 2020-11-22 NOTE — DISCHARGE INSTRUCTIONS
ACTIVITY: Rest and take it easy for the first 24 hours.  A responsible adult is recommended to remain with you during that time.  It is normal to feel sleepy.  We encourage you to not do anything that requires balance, judgment or coordination.    MILD FLU-LIKE SYMPTOMS ARE NORMAL. YOU MAY EXPERIENCE GENERALIZED MUSCLE ACHES, THROAT IRRITATION, HEADACHE AND/OR SOME NAUSEA.    FOR 24 HOURS DO NOT:  Drive, operate machinery or run household appliances.  Drink beer or alcoholic beverages.   Make important decisions or sign legal documents.    SPECIAL INSTRUCTIONS:  DR. DEY' DISCHARGE INSTRUCTIONS FOLLOWING   RADICAL ORCHIECTOMY     DIET:  You can resume your regular diet. We encourage you to eat well-balanced and nutritious meals.      ACTIVITY:  1. Light showers are fine as long as the incision line is dabbed dry and there is no prolonged water exposure.  Please avoid bathing or submersion of wound for 4 weeks.  2. Do not drive while taking narcotic medications as these can make you drowsy.   3. Please refrain from strenuous activity or heavy lifting for at least 4 weeks post op.     WOUND CARE:  1. For the next 3-4 weeks, please keep incisions clean, dry and open to air when possible.  You have absorbable sutures that do not need to be removed.  You are advised not pick, scratch or scrub the incisions.  It is covered with skin glue.  2. Please apply ice to your scrotum every 30 minutes for the first 24 hours while awake.  3. Please wear scrotal support for at least 1 weeks following surgery using “fluff’s” dressing for support in jock strap which may be exchanged if dirty.     MEDICATIONS:  1. Please use over the counter Tylenol or Ibuprofen for pain control as needed. You may use oxycodone 5mg tabs as directed for refractory pain.  2. Please take stool softener daily for first week as directed. Hold for loose stools.  3. If you are using aspirin, Plavix, or coumadin, please don’t restart these medications  until three days after your discharge.    FOLLOW-UP:  We will call you to schedule your follow up appointment in 2-4 weeks. If you have not heard from us in 1-2 business days, please call [phone] to schedule your follow-up appointment. You may also contact this number if you have questions or concerns that can be answered by Dr. Harvey’ staff.     WARNING SIGNS:  Expect to have some bruising and swelling in scrotum over next couple of weeks, but please let us know if you have fever greater than 101 degrees Fahrenheit, chills, nausea or vomiting, blood in urine, redness or swelling in scrotum that is worsening, severe bruising, sever swelling, increasing pain or redness of incision edge, or abdominal swelling. If you are experiencing these symptoms, call the Urology Clinic or go to your local PCP or emergency room.     MEDICAL HELP DURING NORMAL BUSINESS HOURS  Between the hours of 8AM and 5 PM, please call [phone] to speak with Dr. Arturo Harvey’ staff.     MEDICAL HELP AFTER HOURS  If you have a serious emergency such as chest pain, shortness of breath, relentless pain you should call [phone]. For other urgent problems after hours you may contact the urology physician on call by phoning the [phone]. You may also visit the Emergency room at local hospital for help.    For non-emergent problems such as prescription refills or routine questions, please do your best to contact us during normal business hours. This after-hours number should be used for urgent or emergent questions only.       Arturo Harvey M.D.   [address] [place] [address]   [phone]             DIET: To avoid nausea, slowly advance diet as tolerated, avoiding spicy or greasy foods for the first day.  Add more substantial food to your diet according to your physician's instructions.  Babies can be fed formula or breast milk as soon as they are hungry.  INCREASE FLUIDS AND FIBER TO AVOID CONSTIPATION.    SURGICAL  DRESSING/BATHING: See above.    FOLLOW-UP APPOINTMENT:  A follow-up appointment should be arranged with your doctor in 2-4 weeks; call to schedule.    You should CALL YOUR PHYSICIAN if you develop:  Fever greater than 101 degrees F.  Pain not relieved by medication, or persistent nausea or vomiting.  Excessive bleeding (blood soaking through dressing) or unexpected drainage from the wound.  Extreme redness or swelling around the incision site, drainage of pus or foul smelling drainage.  Inability to urinate or empty your bladder within 8 hours.  Problems with breathing or chest pain.    You should call 911 if you develop problems with breathing or chest pain.  If you are unable to contact your doctor or surgical center, you should go to the nearest emergency room or urgent care center.  Physician's telephone #: Dr. Harvey [phone]    If any questions arise, call your doctor.  If your doctor is not available, please feel free to call the Surgical Center at [phone]. The Contact Center is open Monday through Friday 7AM to 5PM and may speak to a nurse at [phone], or toll free at [phone].     A registered nurse may call you a few days after your surgery to see how you are doing after your procedure.    MEDICATIONS: Resume taking daily medication.  Take prescribed pain medication with food.  If no medication is prescribed, you may take non-aspirin pain medication if needed.  PAIN MEDICATION CAN BE VERY CONSTIPATING.  Take a stool softener or laxative such as senokot, pericolace, or milk of magnesia if needed.    Prescription given for Oxycodone (pain medication).  Last pain medication given at 09:30am.    If your physician has prescribed pain medication that includes Acetaminophen (Tylenol), do not take additional Acetaminophen (Tylenol) while taking the prescribed medication.    Depression / Suicide Risk    As you are discharged from this [facility] facility, it is important to learn how to  keep safe from harming yourself.    Recognize the warning signs:  · Abrupt changes in personality, positive or negative- including increase in energy   · Giving away possessions  · Change in eating patterns- significant weight changes-  positive or negative  · Change in sleeping patterns- unable to sleep or sleeping all the time   · Unwillingness or inability to communicate  · Depression  · Unusual sadness, discouragement and loneliness  · Talk of wanting to die  · Neglect of personal appearance   · Rebelliousness- reckless behavior  · Withdrawal from people/activities they love  · Confusion- inability to concentrate     If you or a loved one observes any of these behaviors or has concerns about self-harm, here's what you can do:  · Talk about it- your feelings and reasons for harming yourself  · Remove any means that you might use to hurt yourself (examples: pills, rope, extension cords, firearm)  · Get professional help from the community (Mental Health, Substance Abuse, psychological counseling)  · Do not be alone:Call your Safe Contact- someone whom you trust who will be there for you.  · Call your local CRISIS HOTLINE 372-2895 or 201-657-7251  · Call your local Children's Mobile Crisis Response Team [place] (545) 670-4123 or www.[employer]  · Call the toll free National Suicide Prevention Hotlines   · National Suicide Prevention Lifeline 792-745-VRLW (5442)  · National Hope Line Network 800-SUICIDE (445-6565)

## 2020-11-22 NOTE — ANESTHESIA PREPROCEDURE EVALUATION
Relevant Problems   No relevant active problems       Physical Exam    Airway   Mallampati: I  TM distance: >3 FB  Neck ROM: full       Cardiovascular - normal exam     Dental       Very poor dentition  Facial Hair   Pulmonary   Breath sounds clear to auscultation     Abdominal    Neurological - normal exam                 Anesthesia Plan    ASA 2       Plan - general       Airway plan will be LMA        Induction: intravenous      Pertinent diagnostic labs and testing reviewed    Informed Consent:    Anesthetic plan and risks discussed with patient.

## 2020-11-23 LAB — PATHOLOGY CONSULT NOTE: NORMAL

## 2020-12-04 ENCOUNTER — HOSPITAL ENCOUNTER (OUTPATIENT)
Facility: MEDICAL CENTER | Age: 45
End: 2020-12-04
Attending: UROLOGY

## 2020-12-04 PROCEDURE — 85025 COMPLETE CBC W/AUTO DIFF WBC: CPT

## 2020-12-04 PROCEDURE — 83615 LACTATE (LD) (LDH) ENZYME: CPT

## 2020-12-04 PROCEDURE — 84702 CHORIONIC GONADOTROPIN TEST: CPT

## 2020-12-04 PROCEDURE — 82105 ALPHA-FETOPROTEIN SERUM: CPT

## 2020-12-04 PROCEDURE — 80053 COMPREHEN METABOLIC PANEL: CPT

## 2020-12-05 LAB
ALBUMIN SERPL BCP-MCNC: 4.8 G/DL (ref 3.2–4.9)
ALBUMIN/GLOB SERPL: 1.5 G/DL
ALP SERPL-CCNC: 193 U/L (ref 30–99)
ALT SERPL-CCNC: 145 U/L (ref 2–50)
ANION GAP SERPL CALC-SCNC: 16 MMOL/L (ref 7–16)
AST SERPL-CCNC: 56 U/L (ref 12–45)
B-HCG SERPL-ACNC: <1 MIU/ML (ref 0–5)
BASOPHILS # BLD AUTO: 0.5 % (ref 0–1.8)
BASOPHILS # BLD: 0.08 K/UL (ref 0–0.12)
BILIRUB SERPL-MCNC: 0.2 MG/DL (ref 0.1–1.5)
BUN SERPL-MCNC: 10 MG/DL (ref 8–22)
CALCIUM SERPL-MCNC: 10.6 MG/DL (ref 8.5–10.5)
CHLORIDE SERPL-SCNC: 99 MMOL/L (ref 96–112)
CO2 SERPL-SCNC: 21 MMOL/L (ref 20–33)
CREAT SERPL-MCNC: 0.96 MG/DL (ref 0.5–1.4)
EOSINOPHIL # BLD AUTO: 0.09 K/UL (ref 0–0.51)
EOSINOPHIL NFR BLD: 0.6 % (ref 0–6.9)
ERYTHROCYTE [DISTWIDTH] IN BLOOD BY AUTOMATED COUNT: 50.9 FL (ref 35.9–50)
GLOBULIN SER CALC-MCNC: 3.3 G/DL (ref 1.9–3.5)
GLUCOSE SERPL-MCNC: 93 MG/DL (ref 65–99)
HCT VFR BLD AUTO: 50.9 % (ref 42–52)
HGB BLD-MCNC: 16.4 G/DL (ref 14–18)
IMM GRANULOCYTES # BLD AUTO: 0.07 K/UL (ref 0–0.11)
IMM GRANULOCYTES NFR BLD AUTO: 0.4 % (ref 0–0.9)
LDH SERPL L TO P-CCNC: 180 U/L (ref 107–266)
LYMPHOCYTES # BLD AUTO: 3.95 K/UL (ref 1–4.8)
LYMPHOCYTES NFR BLD: 24.5 % (ref 22–41)
MCH RBC QN AUTO: 31.7 PG (ref 27–33)
MCHC RBC AUTO-ENTMCNC: 32.2 G/DL (ref 33.7–35.3)
MCV RBC AUTO: 98.5 FL (ref 81.4–97.8)
MONOCYTES # BLD AUTO: 1.04 K/UL (ref 0–0.85)
MONOCYTES NFR BLD AUTO: 6.5 % (ref 0–13.4)
NEUTROPHILS # BLD AUTO: 10.88 K/UL (ref 1.82–7.42)
NEUTROPHILS NFR BLD: 67.5 % (ref 44–72)
NRBC # BLD AUTO: 0 K/UL
NRBC BLD-RTO: 0 /100 WBC
PLATELET # BLD AUTO: 480 K/UL (ref 164–446)
PMV BLD AUTO: 12.4 FL (ref 9–12.9)
POTASSIUM SERPL-SCNC: 4.5 MMOL/L (ref 3.6–5.5)
PROT SERPL-MCNC: 8.1 G/DL (ref 6–8.2)
RBC # BLD AUTO: 5.17 M/UL (ref 4.7–6.1)
SODIUM SERPL-SCNC: 136 MMOL/L (ref 135–145)
WBC # BLD AUTO: 16.1 K/UL (ref 4.8–10.8)

## 2020-12-08 LAB — AFP-TM SERPL-MCNC: 5 NG/ML (ref 0–9)

## 2020-12-11 ENCOUNTER — HOSPITAL ENCOUNTER (OUTPATIENT)
Dept: RADIOLOGY | Facility: MEDICAL CENTER | Age: 45
End: 2020-12-11
Attending: UROLOGY

## 2020-12-11 DIAGNOSIS — E83.52 HYPERCALCEMIA: ICD-10-CM

## 2020-12-11 DIAGNOSIS — K76.9 LIVER DISEASE: ICD-10-CM

## 2020-12-11 PROCEDURE — 700117 HCHG RX CONTRAST REV CODE 255: Performed by: UROLOGY

## 2020-12-11 PROCEDURE — 74183 MRI ABD W/O CNTR FLWD CNTR: CPT

## 2020-12-11 PROCEDURE — A9576 INJ PROHANCE MULTIPACK: HCPCS | Performed by: UROLOGY

## 2020-12-11 RX ADMIN — GADOTERIDOL 15 ML: 279.3 INJECTION, SOLUTION INTRAVENOUS at 17:02

## 2020-12-28 ENCOUNTER — HOSPITAL ENCOUNTER (OUTPATIENT)
Dept: RADIATION ONCOLOGY | Facility: MEDICAL CENTER | Age: 45
End: 2020-12-31
Attending: RADIOLOGY

## 2020-12-28 VITALS
HEIGHT: 66 IN | TEMPERATURE: 98 F | SYSTOLIC BLOOD PRESSURE: 142 MMHG | HEART RATE: 106 BPM | WEIGHT: 151.8 LBS | BODY MASS INDEX: 24.4 KG/M2 | DIASTOLIC BLOOD PRESSURE: 99 MMHG | OXYGEN SATURATION: 96 %

## 2020-12-28 DIAGNOSIS — C62.91 MALIGNANT NEOPLASM OF RIGHT TESTIS, UNSPECIFIED WHETHER DESCENDED OR UNDESCENDED (HCC): ICD-10-CM

## 2020-12-28 PROCEDURE — 99205 OFFICE O/P NEW HI 60 MIN: CPT | Performed by: RADIOLOGY

## 2020-12-28 PROCEDURE — 99214 OFFICE O/P EST MOD 30 MIN: CPT | Performed by: RADIOLOGY

## 2020-12-28 RX ORDER — ACETAMINOPHEN 500 MG
500-1000 TABLET ORAL EVERY 6 HOURS PRN
COMMUNITY

## 2020-12-28 ASSESSMENT — PAIN SCALES - GENERAL
PAINLEVEL: NO PAIN
PAINLEVEL: NO PAIN

## 2020-12-28 ASSESSMENT — FIBROSIS 4 INDEX: FIB4 SCORE: 0.44

## 2020-12-28 NOTE — NON-PROVIDER
"Patient was seen today in clinic with Dr. Kelley for consultation.  Vitals signs and weight were obtained and pain assessment was completed.  Allergies and medications were reviewed with the patient.  Review of systems completed.     Vitals/Pain:  Vitals:    12/28/20 0919   BP: 142/99   Pulse: (!) 106   Temp: 36.7 °C (98 °F)   SpO2: 96%   Weight: 68.9 kg (151 lb 12.8 oz)   Height: 1.676 m (5' 6\")   Pain Score: No pain        Allergies:   Patient has no known allergies.    Current Medications:  Current Outpatient Medications   Medication Sig Dispense Refill   • acetaminophen (TYLENOL) 500 MG Tab Take 500-1,000 mg by mouth every 6 hours as needed.     • bismuth subsalicylate (PEPTO-BISMOL) 262 MG/15ML Suspension Take 30 mL by mouth every 6 hours as needed.       No current facility-administered medications for this encounter.          PCP:  Pcp Pt States None        Kathrine Shelton R.N.  "

## 2020-12-28 NOTE — CONSULTS
RADIATION ONCOLOGY CONSULT    DATE OF SERVICE: 12/28/2020    IDENTIFICATION: A 45 y.o. 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  He is here at the kind request of Dr. Ernst Wong.    HISTORY OF PRESENT ILLNESS: Patient's history dates back to 20 years ago when he was diagnosed with a left testicular carcinoma.  He believes it was a seminoma.  He underwent a radical orchiectomy and radiation therapy at [facility].  Records have been destroyed as its 20 years ago.  More recently he noted a right testicular mass.Ultrasound of the scrotum 11/12/2020 showed a heterogeneous hypervascular globular mass with lobulated abnormally shaped right testicle suspicious for malignancy.  CT scan abdomen and pelvis and chest were done 11/17/2020.The chest showed no evidence of metastatic disease in the abdomen and pelvis showed indeterminate small liver lesions recommended follow-up.  Right testicular mass was seen and there was no significant adenopathy.MRI of the abdomen showed small hemangioma in the lateral segment of the left lobe of the liver corresponding to CT findings and there are tiny hepatic cysts in the inferior right lobe and adjacent gallbladder fossa.  No retroperitoneal adenopathy no enhancing renal mass there were bilateral simple renal cysts.  This was done on 12/11/2020.Prior to surgery his LDH was normal. His beta-hCG was elevated at 202 dropped to less than 1 after surgery.  His alpha-fetoprotein prior to surgery was 6 and dropped to 5.  He underwent the radical orchiectomy on 11/22/2020.This was consistent with seminoma syncytial trophoblastic type measuring 2.8 cm and was organ confined.  There was no lymphatic vascular invasion.Currently he is doing well he is here to explore his options for treatment.    PAST MEDICAL HISTORY:   Past  Medical History:   Diagnosis Date   • Cancer (HCC) 2000    left testicular cancer 20 years ago s/p left orchiectomy/radiation @ [place]   • Cancer (HCC) 2020    rt testicular cancer s/p orchiectomy 11/22/20   • Heart burn    • Hx of radiation therapy     2000 at [place] (left testicular cancer)   • Renal disorder     hx of kidney stones    • Snoring        PAST SURGICAL HISTORY:  Past Surgical History:   Procedure Laterality Date   • ORCHIECTOMY Right 11/22/2020    Procedure: ORCHIECTOMY - INGUINAL RADICAL;  Surgeon: Arturo Harvey M.D.;  Location: [facility]GERY [facility];  Service: Urology   • ORCHIECTOMY Left        CURRENT MEDICATIONS:  Current Outpatient Medications   Medication Sig Dispense Refill   • acetaminophen (TYLENOL) 500 MG Tab Take 500-1,000 mg by mouth every 6 hours as needed.     • bismuth subsalicylate (PEPTO-BISMOL) 262 MG/15ML Suspension Take 30 mL by mouth every 6 hours as needed.       No current facility-administered medications for this encounter.        ALLERGIES:    Patient has no known allergies.    FAMILY HISTORY:    Family History   Problem Relation Age of Onset   • Cancer Mother         ovarian cancer   • Cancer Maternal Grandfather         prostate cancer   [unfilled]        SOCIAL HISTORY:     reports that he has been smoking cigarettes. He has a 10.00 pack-year smoking history. He has never used smokeless tobacco. He reports current alcohol use. He reports current drug use. Drug: Inhaled.   Patient is single, has no children and lives in [place], [place]. Patient is a  .      REVIEW OF SYSTEMS: Pertinent positives consist of  Hot flashes dry mouth abdominal pain heartburn indigestion he thinks he ate some bad Chinese food.  He has urinary frequency urgency nocturia and he has occasional cough he has a little bit of discomfort in the groin at the surgical site but that is slowly getting better.  The rest of the review of systems is negative and has been reviewed by me. It is  "[facility]    Pain Scale: 0-10  Pain Assessement: initial   Pain Location, Orientation and Scale: 0/10 no complaints of pain at this time.       PHYSICAL EXAM:    0= Fully active, able to carry on all pre-disease performance without restriction.  Vitals:    12/28/20 0919   BP: 142/99   Pulse: (!) 106   Temp: 36.7 °C (98 °F)   SpO2: 96%   Weight: 68.9 kg (151 lb 12.8 oz)   Height: 1.676 m (5' 6\")   Pain Score: No pain        GENERAL: Well-appearing alert and oriented x3 in no apparent distress  Genitalia: Absent testes bilaterally normal penis no abnormal lumps appreciated  HEENT:  Pupils are equal, round, and reactive to light.  Extraocular muscles   are intact. Sclerae nonicteric.  Conjunctivae pink.  Oral cavity, tongue   protrudes midline.   NECK:   No peripheral adenopathy of the neck, supraclavicular fossa or axillae   bilaterally.  Adenopathy: No inguinal femoral adenopathy.  LUNGS:  Clear to ascultation   HEART:  Regular rate and rhythm.  No murmur appreciated  ABDOMEN:  Soft. No evidence of hepatosplenomegaly.  Evidence of the prior tattoos from the prior radiation.  EXTREMITIES:  Without Edema.  NEUROLOGIC:  Cranial nerves II through XII were intact. Normal stance and gait motor and sensory grossly within normal limits        IMPRESSION:    A 45 y.o. with  Stage Ia right seminoma status post radical orchiectomy with prior history of left orchiectomy and radiation therapy to the abdomen and pelvis 20 years ago..  Because of this I am not recommending radiation therapy.  I think for him close follow-up is the best recommendation.  I have given him the NCCN guidelines for follow-up for testicular carcinoma.  He says his insurance does kick in in February so he should be able to continue with his CT scans and recommended examinations.  He will continue his follow-up with his urologist and Dr. Wong. questions or comments, please do not hesitate in calling.    Please note that " this dictation was created using voice recognition software. I have made every reasonable attempt to correct obvious errors, but I expect that there are errors of grammar and possibly content that I did not discover before finalizing the note.

## 2020-12-29 ENCOUNTER — HOSPITAL ENCOUNTER (OUTPATIENT)
Facility: MEDICAL CENTER | Age: 45
End: 2020-12-29
Attending: UROLOGY

## 2020-12-29 LAB
ALBUMIN SERPL BCP-MCNC: 4.6 G/DL (ref 3.2–4.9)
ALBUMIN/GLOB SERPL: 1.3 G/DL
ALP SERPL-CCNC: 174 U/L (ref 30–99)
ALT SERPL-CCNC: 62 U/L (ref 2–50)
ANION GAP SERPL CALC-SCNC: 11 MMOL/L (ref 7–16)
AST SERPL-CCNC: 45 U/L (ref 12–45)
B-HCG SERPL-ACNC: <1 MIU/ML (ref 0–5)
BASOPHILS # BLD AUTO: 0.7 % (ref 0–1.8)
BASOPHILS # BLD: 0.09 K/UL (ref 0–0.12)
BILIRUB SERPL-MCNC: 0.4 MG/DL (ref 0.1–1.5)
BUN SERPL-MCNC: 13 MG/DL (ref 8–22)
CALCIUM SERPL-MCNC: 10 MG/DL (ref 8.5–10.5)
CHLORIDE SERPL-SCNC: 102 MMOL/L (ref 96–112)
CO2 SERPL-SCNC: 25 MMOL/L (ref 20–33)
CREAT SERPL-MCNC: 0.9 MG/DL (ref 0.5–1.4)
EOSINOPHIL # BLD AUTO: 0.09 K/UL (ref 0–0.51)
EOSINOPHIL NFR BLD: 0.7 % (ref 0–6.9)
ERYTHROCYTE [DISTWIDTH] IN BLOOD BY AUTOMATED COUNT: 48.6 FL (ref 35.9–50)
GLOBULIN SER CALC-MCNC: 3.6 G/DL (ref 1.9–3.5)
GLUCOSE SERPL-MCNC: 115 MG/DL (ref 65–99)
HCT VFR BLD AUTO: 46.1 % (ref 42–52)
HGB BLD-MCNC: 15 G/DL (ref 14–18)
IMM GRANULOCYTES # BLD AUTO: 0.06 K/UL (ref 0–0.11)
IMM GRANULOCYTES NFR BLD AUTO: 0.5 % (ref 0–0.9)
LDH SERPL L TO P-CCNC: 246 U/L (ref 107–266)
LYMPHOCYTES # BLD AUTO: 3.17 K/UL (ref 1–4.8)
LYMPHOCYTES NFR BLD: 24.6 % (ref 22–41)
MCH RBC QN AUTO: 31.1 PG (ref 27–33)
MCHC RBC AUTO-ENTMCNC: 32.5 G/DL (ref 33.7–35.3)
MCV RBC AUTO: 95.6 FL (ref 81.4–97.8)
MONOCYTES # BLD AUTO: 0.91 K/UL (ref 0–0.85)
MONOCYTES NFR BLD AUTO: 7.1 % (ref 0–13.4)
NEUTROPHILS # BLD AUTO: 8.58 K/UL (ref 1.82–7.42)
NEUTROPHILS NFR BLD: 66.4 % (ref 44–72)
NRBC # BLD AUTO: 0 K/UL
NRBC BLD-RTO: 0 /100 WBC
PLATELET # BLD AUTO: 362 K/UL (ref 164–446)
PMV BLD AUTO: 11.9 FL (ref 9–12.9)
POTASSIUM SERPL-SCNC: 4.3 MMOL/L (ref 3.6–5.5)
PROT SERPL-MCNC: 8.2 G/DL (ref 6–8.2)
PTH-INTACT SERPL-MCNC: 41.5 PG/ML (ref 14–72)
RBC # BLD AUTO: 4.82 M/UL (ref 4.7–6.1)
SODIUM SERPL-SCNC: 138 MMOL/L (ref 135–145)
WBC # BLD AUTO: 12.9 K/UL (ref 4.8–10.8)

## 2020-12-29 PROCEDURE — 85025 COMPLETE CBC W/AUTO DIFF WBC: CPT

## 2020-12-29 PROCEDURE — 82105 ALPHA-FETOPROTEIN SERUM: CPT

## 2020-12-29 PROCEDURE — 84702 CHORIONIC GONADOTROPIN TEST: CPT

## 2020-12-29 PROCEDURE — 83615 LACTATE (LD) (LDH) ENZYME: CPT

## 2020-12-29 PROCEDURE — 83970 ASSAY OF PARATHORMONE: CPT

## 2020-12-29 PROCEDURE — 80053 COMPREHEN METABOLIC PANEL: CPT

## 2020-12-31 LAB — AFP-TM SERPL-MCNC: 6 NG/ML (ref 0–9)

## 2021-01-13 ENCOUNTER — OFFICE VISIT (OUTPATIENT)
Dept: URGENT CARE | Facility: PHYSICIAN GROUP | Age: 46
End: 2021-01-13

## 2021-01-13 VITALS
HEART RATE: 120 BPM | DIASTOLIC BLOOD PRESSURE: 76 MMHG | RESPIRATION RATE: 12 BRPM | SYSTOLIC BLOOD PRESSURE: 94 MMHG | OXYGEN SATURATION: 99 % | TEMPERATURE: 97.3 F | WEIGHT: 142 LBS | BODY MASS INDEX: 22.82 KG/M2 | HEIGHT: 66 IN

## 2021-01-13 DIAGNOSIS — J02.0 STREP PHARYNGITIS: ICD-10-CM

## 2021-01-13 LAB
FLUAV+FLUBV AG SPEC QL IA: NORMAL
INT CON NEG: NEGATIVE
INT CON NEG: NEGATIVE
INT CON POS: POSITIVE
INT CON POS: POSITIVE
S PYO AG THROAT QL: POSITIVE

## 2021-01-13 PROCEDURE — 99214 OFFICE O/P EST MOD 30 MIN: CPT | Performed by: PHYSICIAN ASSISTANT

## 2021-01-13 PROCEDURE — 87880 STREP A ASSAY W/OPTIC: CPT | Performed by: PHYSICIAN ASSISTANT

## 2021-01-13 PROCEDURE — 87804 INFLUENZA ASSAY W/OPTIC: CPT | Performed by: PHYSICIAN ASSISTANT

## 2021-01-13 RX ORDER — AMOXICILLIN 500 MG/1
500 CAPSULE ORAL 2 TIMES DAILY
Qty: 20 CAP | Refills: 0 | Status: SHIPPED | OUTPATIENT
Start: 2021-01-13 | End: 2021-01-23

## 2021-01-13 ASSESSMENT — ENCOUNTER SYMPTOMS
SORE THROAT: 1
CHILLS: 1
DIZZINESS: 0
TROUBLE SWALLOWING: 1
SHORTNESS OF BREATH: 0
VOMITING: 0
COUGH: 0
SWOLLEN GLANDS: 1
MYALGIAS: 1
DIARRHEA: 0
HEADACHES: 0
FEVER: 0
NAUSEA: 0
SPUTUM PRODUCTION: 0
ABDOMINAL PAIN: 0

## 2021-01-13 ASSESSMENT — FIBROSIS 4 INDEX: FIB4 SCORE: 0.71

## 2021-01-13 NOTE — PROGRESS NOTES
"Subjective:      Charanjit Morin Jr. is a 45 y.o. male who presents with Pharyngitis (chills headache body aches x3days)            Pharyngitis   This is a new problem. The current episode started in the past 7 days (2 days). The problem has been unchanged. Neither side of throat is experiencing more pain than the other. There has been no fever. The pain is at a severity of 5/10. Associated symptoms include swollen glands and trouble swallowing. Pertinent negatives include no abdominal pain, congestion, coughing, diarrhea, ear pain, headaches, plugged ear sensation, shortness of breath or vomiting. He has had no exposure to strep or mono. He has tried nothing for the symptoms.     Patient presents to urgent care reporting a sore throat x 2 days with associated chills and body aches. No measured fevers, cough, chest pain, SOB, nausea, or vomiting. No known sick contacts. No history of tonsillectomy.       Review of Systems   Constitutional: Positive for chills. Negative for fever.   HENT: Positive for sore throat and trouble swallowing. Negative for congestion and ear pain.    Respiratory: Negative for cough, sputum production and shortness of breath.    Cardiovascular: Negative for chest pain.   Gastrointestinal: Negative for abdominal pain, diarrhea, nausea and vomiting.   Genitourinary: Negative.    Musculoskeletal: Positive for myalgias.   Skin: Negative for rash.   Neurological: Negative for dizziness and headaches.        Objective:     BP (!) 94/76   Pulse (!) 120   Temp 36.3 °C (97.3 °F)   Resp 12   Ht 1.676 m (5' 6\")   Wt 64.4 kg (142 lb)   SpO2 99%   BMI 22.92 kg/m²      Physical Exam  Vitals signs and nursing note reviewed.   Constitutional:       General: He is not in acute distress.     Appearance: Normal appearance. He is well-developed. He is not ill-appearing.   HENT:      Head: Normocephalic and atraumatic.      Right Ear: Tympanic membrane, ear canal and external ear normal. There is no " impacted cerumen.      Left Ear: Tympanic membrane, ear canal and external ear normal. There is no impacted cerumen.      Nose: Nose normal.      Mouth/Throat:      Mouth: Mucous membranes are moist.      Pharynx: Posterior oropharyngeal erythema present.   Eyes:      Conjunctiva/sclera: Conjunctivae normal.      Pupils: Pupils are equal, round, and reactive to light.   Neck:      Musculoskeletal: Normal range of motion.   Cardiovascular:      Rate and Rhythm: Regular rhythm. Tachycardia present.      Heart sounds: Normal heart sounds. No murmur.   Pulmonary:      Effort: Pulmonary effort is normal.      Breath sounds: Normal breath sounds. No wheezing.   Musculoskeletal: Normal range of motion.   Lymphadenopathy:      Cervical: Cervical adenopathy present.   Skin:     General: Skin is warm and dry.   Neurological:      Mental Status: He is alert and oriented to person, place, and time.   Psychiatric:         Behavior: Behavior normal.            PMH:  has a past medical history of Cancer ([facility]) (2000), Cancer ([facility]) (2020), Heart burn, radiation therapy, Renal disorder, and Snoring.  MEDS:   Current Outpatient Medications:   •  Phenylephrine-Pheniramine-DM (THERAFLU COLD & COUGH PO), Take  by mouth., Disp: , Rfl:   •  amoxicillin (AMOXIL) 500 MG Cap, Take 1 Cap by mouth 2 times a day for 10 days., Disp: 20 Cap, Rfl: 0  •  acetaminophen (TYLENOL) 500 MG Tab, Take 500-1,000 mg by mouth every 6 hours as needed., Disp: , Rfl:   •  bismuth subsalicylate (PEPTO-BISMOL) 262 MG/15ML Suspension, Take 30 mL by mouth every 6 hours as needed., Disp: , Rfl:   ALLERGIES: No Known Allergies  SURGHX:   Past Surgical History:   Procedure Laterality Date   • ORCHIECTOMY Right 11/22/2020    Procedure: ORCHIECTOMY - INGUINAL RADICAL;  Surgeon: Arturo Harvey M.D.;  Location: SURGERY [facility];  Service: Urology   • ORCHIECTOMY Left      SOCHX:  reports that he has been smoking cigarettes. He has a 10.00 pack-year smoking history. He  has never used smokeless tobacco. He reports current alcohol use. He reports current drug use. Drugs: Inhaled and Marijuana.  FH: family history includes Cancer in his maternal grandfather and mother.       Assessment/Plan:        1. Strep pharyngitis    - POCT Rapid Strep A: POSITIVE   - POCT Influenza A/B: NEGATIVE   - amoxicillin (AMOXIL) 500 MG Cap; Take 1 Cap by mouth 2 times a day for 10 days.  Dispense: 20 Cap; Refill: 0   - Complete full course of antibiotics as prescribed     - Advised to throw away toothbrush and get a new one 2-3 days after initiation of treatment  - Advised he is contagious for 24 hours after initiating treatment  - Call or return to office if symptoms persist or worsen. The patient demonstrated a good understanding and agreed with the treatment plan.

## 2021-02-04 ENCOUNTER — HOSPITAL ENCOUNTER (OUTPATIENT)
Dept: RADIOLOGY | Facility: MEDICAL CENTER | Age: 46
End: 2021-02-04
Attending: UROLOGY

## 2021-02-04 DIAGNOSIS — E83.52 HYPERCALCEMIA: ICD-10-CM

## 2021-02-04 PROCEDURE — A9503 TC99M MEDRONATE: HCPCS

## 2021-03-19 ENCOUNTER — HOSPITAL ENCOUNTER (OUTPATIENT)
Dept: RADIOLOGY | Facility: MEDICAL CENTER | Age: 46
End: 2021-03-19
Attending: UROLOGY

## 2021-03-19 ENCOUNTER — APPOINTMENT (OUTPATIENT)
Dept: RADIOLOGY | Facility: MEDICAL CENTER | Age: 46
End: 2021-03-19
Attending: UROLOGY

## 2021-03-19 ENCOUNTER — HOSPITAL ENCOUNTER (OUTPATIENT)
Dept: LAB | Facility: MEDICAL CENTER | Age: 46
End: 2021-03-19
Attending: UROLOGY

## 2021-03-19 DIAGNOSIS — C62.90 MALIGNANT NEOPLASM OF TESTICLE, UNSPECIFIED LATERALITY, UNSPECIFIED WHETHER DESCENDED OR UNDESCENDED (HCC): ICD-10-CM

## 2021-03-19 LAB
ALBUMIN SERPL BCP-MCNC: 4.3 G/DL (ref 3.2–4.9)
ALBUMIN/GLOB SERPL: 1.1 G/DL
ALP SERPL-CCNC: 183 U/L (ref 30–99)
ALT SERPL-CCNC: 69 U/L (ref 2–50)
ANION GAP SERPL CALC-SCNC: 13 MMOL/L (ref 7–16)
AST SERPL-CCNC: 55 U/L (ref 12–45)
B-HCG SERPL-ACNC: <1 MIU/ML (ref 0–5)
BASOPHILS # BLD AUTO: 0.6 % (ref 0–1.8)
BASOPHILS # BLD: 0.07 K/UL (ref 0–0.12)
BILIRUB SERPL-MCNC: 0.3 MG/DL (ref 0.1–1.5)
BUN SERPL-MCNC: 11 MG/DL (ref 8–22)
CALCIUM SERPL-MCNC: 9.7 MG/DL (ref 8.5–10.5)
CHLORIDE SERPL-SCNC: 99 MMOL/L (ref 96–112)
CO2 SERPL-SCNC: 23 MMOL/L (ref 20–33)
CREAT SERPL-MCNC: 1.09 MG/DL (ref 0.5–1.4)
EOSINOPHIL # BLD AUTO: 0.16 K/UL (ref 0–0.51)
EOSINOPHIL NFR BLD: 1.4 % (ref 0–6.9)
ERYTHROCYTE [DISTWIDTH] IN BLOOD BY AUTOMATED COUNT: 50.4 FL (ref 35.9–50)
GLOBULIN SER CALC-MCNC: 3.8 G/DL (ref 1.9–3.5)
GLUCOSE SERPL-MCNC: 113 MG/DL (ref 65–99)
HCT VFR BLD AUTO: 45 % (ref 42–52)
HGB BLD-MCNC: 14.6 G/DL (ref 14–18)
IMM GRANULOCYTES # BLD AUTO: 0.04 K/UL (ref 0–0.11)
IMM GRANULOCYTES NFR BLD AUTO: 0.4 % (ref 0–0.9)
LDH SERPL L TO P-CCNC: 218 U/L (ref 107–266)
LYMPHOCYTES # BLD AUTO: 4.34 K/UL (ref 1–4.8)
LYMPHOCYTES NFR BLD: 38.4 % (ref 22–41)
MCH RBC QN AUTO: 30.9 PG (ref 27–33)
MCHC RBC AUTO-ENTMCNC: 32.4 G/DL (ref 33.7–35.3)
MCV RBC AUTO: 95.1 FL (ref 81.4–97.8)
MONOCYTES # BLD AUTO: 0.66 K/UL (ref 0–0.85)
MONOCYTES NFR BLD AUTO: 5.8 % (ref 0–13.4)
NEUTROPHILS # BLD AUTO: 6.03 K/UL (ref 1.82–7.42)
NEUTROPHILS NFR BLD: 53.4 % (ref 44–72)
NRBC # BLD AUTO: 0 K/UL
NRBC BLD-RTO: 0 /100 WBC
PLATELET # BLD AUTO: 391 K/UL (ref 164–446)
PMV BLD AUTO: 11.7 FL (ref 9–12.9)
POTASSIUM SERPL-SCNC: 3.5 MMOL/L (ref 3.6–5.5)
PROT SERPL-MCNC: 8.1 G/DL (ref 6–8.2)
RBC # BLD AUTO: 4.73 M/UL (ref 4.7–6.1)
SODIUM SERPL-SCNC: 135 MMOL/L (ref 135–145)
WBC # BLD AUTO: 11.3 K/UL (ref 4.8–10.8)

## 2021-03-19 PROCEDURE — 71260 CT THORAX DX C+: CPT

## 2021-03-19 PROCEDURE — 83615 LACTATE (LD) (LDH) ENZYME: CPT

## 2021-03-19 PROCEDURE — 80053 COMPREHEN METABOLIC PANEL: CPT

## 2021-03-19 PROCEDURE — 700117 HCHG RX CONTRAST REV CODE 255: Performed by: UROLOGY

## 2021-03-19 PROCEDURE — 36415 COLL VENOUS BLD VENIPUNCTURE: CPT

## 2021-03-19 PROCEDURE — 85025 COMPLETE CBC W/AUTO DIFF WBC: CPT

## 2021-03-19 PROCEDURE — 84702 CHORIONIC GONADOTROPIN TEST: CPT

## 2021-03-19 RX ADMIN — IOHEXOL 25 ML: 240 INJECTION, SOLUTION INTRATHECAL; INTRAVASCULAR; INTRAVENOUS; ORAL at 12:15

## 2021-03-19 RX ADMIN — IOHEXOL 100 ML: 350 INJECTION, SOLUTION INTRAVENOUS at 12:15

## 2022-01-24 ENCOUNTER — OFFICE VISIT (OUTPATIENT)
Dept: URGENT CARE | Facility: PHYSICIAN GROUP | Age: 47
End: 2022-01-24

## 2022-01-24 VITALS
HEIGHT: 66 IN | SYSTOLIC BLOOD PRESSURE: 114 MMHG | BODY MASS INDEX: 24.91 KG/M2 | HEART RATE: 84 BPM | WEIGHT: 155 LBS | TEMPERATURE: 98.1 F | OXYGEN SATURATION: 98 % | DIASTOLIC BLOOD PRESSURE: 76 MMHG | RESPIRATION RATE: 14 BRPM

## 2022-01-24 DIAGNOSIS — L02.91 ABSCESS: ICD-10-CM

## 2022-01-24 DIAGNOSIS — L08.9 SOFT TISSUE INFECTION: ICD-10-CM

## 2022-01-24 PROCEDURE — 99214 OFFICE O/P EST MOD 30 MIN: CPT | Performed by: PHYSICIAN ASSISTANT

## 2022-01-24 RX ORDER — HYDROCODONE BITARTRATE AND ACETAMINOPHEN 5; 325 MG/1; MG/1
1 TABLET ORAL EVERY 8 HOURS PRN
Qty: 6 TABLET | Refills: 0 | Status: SHIPPED | OUTPATIENT
Start: 2022-01-24 | End: 2022-01-26

## 2022-01-24 RX ORDER — SULFAMETHOXAZOLE AND TRIMETHOPRIM 800; 160 MG/1; MG/1
1 TABLET ORAL 2 TIMES DAILY
Qty: 14 TABLET | Refills: 0 | Status: SHIPPED | OUTPATIENT
Start: 2022-01-24 | End: 2022-01-31

## 2022-01-24 ASSESSMENT — ENCOUNTER SYMPTOMS
NAUSEA: 0
VOMITING: 0
FEVER: 0

## 2022-01-24 ASSESSMENT — FIBROSIS 4 INDEX: FIB4 SCORE: 0.78

## 2022-01-24 NOTE — PROGRESS NOTES
Subjective:   Charanjit Morin Jr. is a 46 y.o. male who presents for Abscess (x 3 days )        Patient presents for evaluation forming abscess on his left buttock that has been present for the last 2 to 3 days.  Overall symptoms have been worsening and his pain has become significant.  He states that the area feels very firm and tender but has not noticed any drainage from the region.  He has had an abscess in this area previously has bit of scarring from prior I&D.  He denies nausea, vomiting, fevers, chills.  Denies allergies to antibiotics.    Review of Systems   Constitutional: Negative for fever.   Gastrointestinal: Negative for nausea and vomiting.       PMH:  has a past medical history of Cancer (HCC) (2000), Cancer (HCC) (2020), Heart burn, radiation therapy, Renal disorder, and Snoring.  MEDS:   Current Outpatient Medications:   •  HYDROcodone-acetaminophen (NORCO) 5-325 MG Tab per tablet, Take 1 Tablet by mouth every 8 hours as needed for up to 2 days., Disp: 6 Tablet, Rfl: 0  •  sulfamethoxazole-trimethoprim (BACTRIM DS) 800-160 MG tablet, Take 1 Tablet by mouth 2 times a day for 7 days., Disp: 14 Tablet, Rfl: 0  •  acetaminophen (TYLENOL) 500 MG Tab, Take 500-1,000 mg by mouth every 6 hours as needed., Disp: , Rfl:   •  bismuth subsalicylate (PEPTO-BISMOL) 262 MG/15ML Suspension, Take 30 mL by mouth every 6 hours as needed., Disp: , Rfl:   •  Phenylephrine-Pheniramine-DM (THERAFLU COLD & COUGH PO), Take  by mouth. (Patient not taking: Reported on 1/24/2022), Disp: , Rfl:   ALLERGIES: No Known Allergies  SURGHX:   Past Surgical History:   Procedure Laterality Date   • ORCHIECTOMY Right 11/22/2020    Procedure: ORCHIECTOMY - INGUINAL RADICAL;  Surgeon: Arturo Harvey M.D.;  Location: SURGERY [facility];  Service: Urology   • ORCHIECTOMY Left      SOCHX:  reports that he has been smoking cigarettes. He has a 10.00 pack-year smoking history. He has never used smokeless tobacco. He reports current alcohol  "use. He reports current drug use. Drugs: Inhaled and Marijuana.  FH: Family history was reviewed, no pertinent findings to report   Objective:   /76   Pulse 84   Temp 36.7 °C (98.1 °F) (Temporal)   Resp 14   Ht 1.676 m (5' 6\")   Wt 70.3 kg (155 lb)   SpO2 98%   BMI 25.02 kg/m²   Physical Exam  Vitals reviewed.   Constitutional:       General: He is not in acute distress.     Appearance: Normal appearance. He is well-developed. He is not toxic-appearing.   HENT:      Head: Normocephalic and atraumatic.      Right Ear: External ear normal.      Left Ear: External ear normal.      Nose: Nose normal.   Cardiovascular:      Rate and Rhythm: Normal rate and regular rhythm.   Pulmonary:      Effort: Pulmonary effort is normal. No respiratory distress.      Breath sounds: No stridor.   Skin:     General: Skin is dry.          Neurological:      Comments: Alert and oriented.    Psychiatric:         Speech: Speech normal.         Behavior: Behavior normal.           Assessment/Plan:   1. Abscess  - HYDROcodone-acetaminophen (NORCO) 5-325 MG Tab per tablet; Take 1 Tablet by mouth every 8 hours as needed for up to 2 days.  Dispense: 6 Tablet; Refill: 0  - sulfamethoxazole-trimethoprim (BACTRIM DS) 800-160 MG tablet; Take 1 Tablet by mouth 2 times a day for 7 days.  Dispense: 14 Tablet; Refill: 0    2. Soft tissue infection  - sulfamethoxazole-trimethoprim (BACTRIM DS) 800-160 MG tablet; Take 1 Tablet by mouth 2 times a day for 7 days.  Dispense: 14 Tablet; Refill: 0    Patient has forming abscess on his left buttock.  It is indurated, but there is no fluctuance or drainage.  I feel that incision and drainage will be of little utility at this point in time.  We will start patient on antibiotics and I would like him to continue frequent warm compresses. If lesion becomes fluctuant and fails to spontaneously drain I would like him to return to clinic immediately for reevaluation and I&D, as indicated.  Additionally if " symptoms fail to improve in the next 48 to 72 hours I would also like him to return to clinic for reevaluation.  If he develops signs of systemic infection-to ED for reevaluation.       Patient was prescribed a small amount of narcotic analgesia for pain relief for the next 2 days as ibuprofen has been insufficient in controlling his pain.  PDMP was reviewed and patient does not demonstrate increased risk for abuse.  Medication side effects and risks discussed with patient and he verbalized good understanding of appropriate use and risks of medication.       Differential diagnosis, natural history, supportive care, and indications for immediate follow-up discussed.

## 2022-05-26 ENCOUNTER — HOSPITAL ENCOUNTER (OUTPATIENT)
Dept: LAB | Facility: MEDICAL CENTER | Age: 47
End: 2022-05-26
Attending: UROLOGY

## 2022-05-26 LAB
ALBUMIN SERPL BCP-MCNC: 4.1 G/DL (ref 3.2–4.9)
ALBUMIN/GLOB SERPL: 1 G/DL
ALP SERPL-CCNC: 188 U/L (ref 30–99)
ALT SERPL-CCNC: 49 U/L (ref 2–50)
ANION GAP SERPL CALC-SCNC: 15 MMOL/L (ref 7–16)
AST SERPL-CCNC: 55 U/L (ref 12–45)
B-HCG SERPL-ACNC: <1 MIU/ML (ref 0–5)
BASOPHILS # BLD AUTO: 0.7 % (ref 0–1.8)
BASOPHILS # BLD: 0.1 K/UL (ref 0–0.12)
BILIRUB SERPL-MCNC: 0.4 MG/DL (ref 0.1–1.5)
BUN SERPL-MCNC: 5 MG/DL (ref 8–22)
CALCIUM SERPL-MCNC: 9.2 MG/DL (ref 8.5–10.5)
CHLORIDE SERPL-SCNC: 99 MMOL/L (ref 96–112)
CO2 SERPL-SCNC: 24 MMOL/L (ref 20–33)
CREAT SERPL-MCNC: 0.83 MG/DL (ref 0.5–1.4)
EOSINOPHIL # BLD AUTO: 0.13 K/UL (ref 0–0.51)
EOSINOPHIL NFR BLD: 1 % (ref 0–6.9)
ERYTHROCYTE [DISTWIDTH] IN BLOOD BY AUTOMATED COUNT: 49.7 FL (ref 35.9–50)
GFR SERPLBLD CREATININE-BSD FMLA CKD-EPI: 109 ML/MIN/1.73 M 2
GLOBULIN SER CALC-MCNC: 4 G/DL (ref 1.9–3.5)
GLUCOSE SERPL-MCNC: 147 MG/DL (ref 65–99)
HCT VFR BLD AUTO: 46.6 % (ref 42–52)
HGB BLD-MCNC: 15.7 G/DL (ref 14–18)
IMM GRANULOCYTES # BLD AUTO: 0.11 K/UL (ref 0–0.11)
IMM GRANULOCYTES NFR BLD AUTO: 0.8 % (ref 0–0.9)
LYMPHOCYTES # BLD AUTO: 3.1 K/UL (ref 1–4.8)
LYMPHOCYTES NFR BLD: 23 % (ref 22–41)
MCH RBC QN AUTO: 32.2 PG (ref 27–33)
MCHC RBC AUTO-ENTMCNC: 33.7 G/DL (ref 33.7–35.3)
MCV RBC AUTO: 95.7 FL (ref 81.4–97.8)
MONOCYTES # BLD AUTO: 1.27 K/UL (ref 0–0.85)
MONOCYTES NFR BLD AUTO: 9.4 % (ref 0–13.4)
NEUTROPHILS # BLD AUTO: 8.76 K/UL (ref 1.82–7.42)
NEUTROPHILS NFR BLD: 65.1 % (ref 44–72)
NRBC # BLD AUTO: 0 K/UL
NRBC BLD-RTO: 0 /100 WBC
PLATELET # BLD AUTO: 435 K/UL (ref 164–446)
PMV BLD AUTO: 11.5 FL (ref 9–12.9)
POTASSIUM SERPL-SCNC: 3.2 MMOL/L (ref 3.6–5.5)
PROT SERPL-MCNC: 8.1 G/DL (ref 6–8.2)
RBC # BLD AUTO: 4.87 M/UL (ref 4.7–6.1)
SODIUM SERPL-SCNC: 138 MMOL/L (ref 135–145)
WBC # BLD AUTO: 13.5 K/UL (ref 4.8–10.8)

## 2022-05-26 PROCEDURE — 80053 COMPREHEN METABOLIC PANEL: CPT

## 2022-05-26 PROCEDURE — 85025 COMPLETE CBC W/AUTO DIFF WBC: CPT

## 2022-05-26 PROCEDURE — 84702 CHORIONIC GONADOTROPIN TEST: CPT

## 2022-05-26 PROCEDURE — 36415 COLL VENOUS BLD VENIPUNCTURE: CPT

## 2022-06-02 ENCOUNTER — HOSPITAL ENCOUNTER (OUTPATIENT)
Dept: RADIOLOGY | Facility: MEDICAL CENTER | Age: 47
End: 2022-06-02
Attending: UROLOGY

## 2022-06-02 DIAGNOSIS — C62.90 MALIGNANT NEOPLASM OF TESTICLE, UNSPECIFIED LATERALITY, UNSPECIFIED WHETHER DESCENDED OR UNDESCENDED (HCC): ICD-10-CM

## 2022-06-02 PROCEDURE — 74177 CT ABD & PELVIS W/CONTRAST: CPT

## 2022-06-02 PROCEDURE — 700117 HCHG RX CONTRAST REV CODE 255: Performed by: UROLOGY

## 2022-06-02 RX ADMIN — IOHEXOL 25 ML: 240 INJECTION, SOLUTION INTRATHECAL; INTRAVASCULAR; INTRAVENOUS; ORAL at 10:43

## 2022-06-02 RX ADMIN — IOHEXOL 100 ML: 350 INJECTION, SOLUTION INTRAVENOUS at 10:43

## 2022-06-07 ENCOUNTER — HOSPITAL ENCOUNTER (OUTPATIENT)
Facility: MEDICAL CENTER | Age: 47
End: 2022-06-07
Attending: PHYSICIAN ASSISTANT

## 2022-06-07 PROCEDURE — 83615 LACTATE (LD) (LDH) ENZYME: CPT

## 2022-06-07 PROCEDURE — 84403 ASSAY OF TOTAL TESTOSTERONE: CPT

## 2022-06-07 PROCEDURE — 82105 ALPHA-FETOPROTEIN SERUM: CPT

## 2022-06-07 PROCEDURE — 84153 ASSAY OF PSA TOTAL: CPT

## 2022-06-08 LAB
LDH SERPL L TO P-CCNC: 163 U/L (ref 107–266)
PSA SERPL-MCNC: 0.6 NG/ML (ref 0–4)
TESTOST SERPL-MCNC: 31 NG/DL (ref 175–781)

## 2022-06-09 LAB — AFP-TM SERPL-MCNC: 6 NG/ML (ref 0–9)

## 2023-04-11 ENCOUNTER — HOSPITAL ENCOUNTER (OUTPATIENT)
Facility: MEDICAL CENTER | Age: 48
End: 2023-04-11
Attending: PHYSICIAN ASSISTANT
Payer: COMMERCIAL

## 2023-04-11 LAB
HCT VFR BLD AUTO: 46.8 % (ref 42–52)
HGB BLD-MCNC: 15.7 G/DL (ref 14–18)

## 2023-04-11 PROCEDURE — 85014 HEMATOCRIT: CPT

## 2023-04-11 PROCEDURE — 84153 ASSAY OF PSA TOTAL: CPT

## 2023-04-11 PROCEDURE — 84403 ASSAY OF TOTAL TESTOSTERONE: CPT

## 2023-04-11 PROCEDURE — 85018 HEMOGLOBIN: CPT

## 2023-04-12 LAB
PSA SERPL-MCNC: 0.61 NG/ML (ref 0–4)
TESTOST SERPL-MCNC: 116 NG/DL (ref 175–781)

## 2023-04-17 ENCOUNTER — HOSPITAL ENCOUNTER (OUTPATIENT)
Facility: MEDICAL CENTER | Age: 48
End: 2023-04-17
Attending: UROLOGY
Payer: COMMERCIAL

## 2023-04-17 LAB
B-HCG SERPL-ACNC: <1 MIU/ML (ref 0–5)
LDH SERPL L TO P-CCNC: 268 U/L (ref 107–266)

## 2023-04-17 PROCEDURE — 84702 CHORIONIC GONADOTROPIN TEST: CPT

## 2023-04-17 PROCEDURE — 83615 LACTATE (LD) (LDH) ENZYME: CPT

## 2023-04-17 PROCEDURE — 82105 ALPHA-FETOPROTEIN SERUM: CPT

## 2023-04-19 LAB — AFP-TM SERPL-MCNC: 7 NG/ML (ref 0–9)

## 2023-06-22 ENCOUNTER — OFFICE VISIT (OUTPATIENT)
Dept: URGENT CARE | Facility: PHYSICIAN GROUP | Age: 48
End: 2023-06-22
Payer: COMMERCIAL

## 2023-06-22 VITALS
WEIGHT: 142 LBS | TEMPERATURE: 98.7 F | BODY MASS INDEX: 22.82 KG/M2 | SYSTOLIC BLOOD PRESSURE: 118 MMHG | DIASTOLIC BLOOD PRESSURE: 64 MMHG | OXYGEN SATURATION: 97 % | HEART RATE: 101 BPM | RESPIRATION RATE: 16 BRPM | HEIGHT: 66 IN

## 2023-06-22 DIAGNOSIS — H00.11 CHALAZION OF RIGHT UPPER EYELID: ICD-10-CM

## 2023-06-22 PROCEDURE — 3074F SYST BP LT 130 MM HG: CPT | Performed by: FAMILY MEDICINE

## 2023-06-22 PROCEDURE — 99213 OFFICE O/P EST LOW 20 MIN: CPT | Performed by: FAMILY MEDICINE

## 2023-06-22 PROCEDURE — 3078F DIAST BP <80 MM HG: CPT | Performed by: FAMILY MEDICINE

## 2023-06-22 RX ORDER — DOXYCYCLINE HYCLATE 100 MG
100 TABLET ORAL EVERY 12 HOURS
Qty: 14 TABLET | Refills: 0 | Status: SHIPPED | OUTPATIENT
Start: 2023-06-22 | End: 2023-06-29

## 2023-06-22 ASSESSMENT — FIBROSIS 4 INDEX: FIB4 SCORE: 0.85

## 2023-06-22 NOTE — PROGRESS NOTES
"Subjective     Charanjit Morin Jr. is a 47 y.o. male who presents with Other (Not sure if it is a sty or cyst on right eye - started to notice it 3 weeks ago patient states )      - This is a pleasant and nontoxic appearing 47 y.o. person who has come to the walk-in clinic today for:    #1) 3wks ago bump started Rt lid. Getting bigger. No pain. No trauma, vision change or use contact lenses      ALLERGIES:  Patient has no known allergies.     PMH:  Past Medical History:   Diagnosis Date    Cancer (HCC) 2000    left testicular cancer 20 years ago s/p left orchiectomy/radiation @ [place]    Cancer (HCC) 2020    rt testicular cancer s/p orchiectomy 11/22/20    Heart burn     Hx of radiation therapy     2000 at [place] (left testicular cancer)    Renal disorder     hx of kidney stones     Snoring         PSH:  Past Surgical History:   Procedure Laterality Date    ORCHIECTOMY Right 11/22/2020    Procedure: ORCHIECTOMY - INGUINAL RADICAL;  Surgeon: Arturo Harvey M.D.;  Location: SURGERY [facility];  Service: Urology    ORCHIECTOMY Left        MEDS:    Current Outpatient Medications:     doxycycline (VIBRAMYCIN) 100 MG Tab, Take 1 Tablet by mouth every 12 hours for 7 days., Disp: 14 Tablet, Rfl: 0    acetaminophen (TYLENOL) 500 MG Tab, Take 500-1,000 mg by mouth every 6 hours as needed., Disp: , Rfl:     bismuth subsalicylate (PEPTO-BISMOL) 262 MG/15ML Suspension, Take 30 mL by mouth every 6 hours as needed., Disp: , Rfl:     ** I have documented what I find to be significant in regards to past medical, social, family and surgical history  in my HPI or under PMH/PSH/FH review section, otherwise it is noncontributory **         HPI    Review of Systems   Eyes:         Eye swelling   All other systems reviewed and are negative.             Objective     /64   Pulse (!) 101   Temp 37.1 °C (98.7 °F) (Temporal)   Resp 16   Ht 1.676 m (5' 6\")   Wt 64.4 kg (142 lb)   SpO2 97%   BMI 22.92 kg/m²      Physical " Exam  Vitals and nursing note reviewed.   Constitutional:       General: He is not in acute distress.     Appearance: Normal appearance. He is well-developed.   HENT:      Head: Normocephalic.   Eyes:        Comments: Rt eye: upper lateral lid w/ ~0.75x1cm cyst like bump on lid   Pulmonary:      Effort: Pulmonary effort is normal. No respiratory distress.   Neurological:      Mental Status: He is alert.      Motor: No abnormal muscle tone.   Psychiatric:         Mood and Affect: Mood normal.         Behavior: Behavior normal.           Assessment & Plan     1. Chalazion of right upper eyelid  doxycycline (VIBRAMYCIN) 100 MG Tab    Referral to Ophthalmology          - Dx, plan & d/c instructions discussed   - warm compresses 10 min 6-8x/day       Follow up with your regular primary care providers office for a recheck on today's visit. ER if not improving in 2-3 days or if feeling/getting worse. (If you do not have a primary care provider and need to schedule one you may call [facility] at [phone] to do this).    Patient left in stable condition     Pertinent prior office visit notes in [employer] have been reviewed by me today on day of this visit.

## 2024-09-05 ENCOUNTER — HOSPITAL ENCOUNTER (OUTPATIENT)
Dept: LAB | Facility: MEDICAL CENTER | Age: 49
End: 2024-09-05
Attending: STUDENT IN AN ORGANIZED HEALTH CARE EDUCATION/TRAINING PROGRAM
Payer: COMMERCIAL

## 2024-09-05 LAB
ALBUMIN SERPL BCP-MCNC: 4.2 G/DL (ref 3.2–4.9)
ALBUMIN/GLOB SERPL: 0.9 G/DL
ALP SERPL-CCNC: 272 U/L (ref 30–99)
ALT SERPL-CCNC: 22 U/L (ref 2–50)
ANION GAP SERPL CALC-SCNC: 17 MMOL/L (ref 7–16)
AST SERPL-CCNC: 16 U/L (ref 12–45)
BASOPHILS # BLD AUTO: 0.6 % (ref 0–1.8)
BASOPHILS # BLD: 0.08 K/UL (ref 0–0.12)
BILIRUB SERPL-MCNC: 0.3 MG/DL (ref 0.1–1.5)
BUN SERPL-MCNC: 5 MG/DL (ref 8–22)
CALCIUM ALBUM COR SERPL-MCNC: 10.7 MG/DL (ref 8.5–10.5)
CALCIUM SERPL-MCNC: 10.9 MG/DL (ref 8.5–10.5)
CHLORIDE SERPL-SCNC: 88 MMOL/L (ref 96–112)
CHOLEST SERPL-MCNC: 341 MG/DL (ref 100–199)
CO2 SERPL-SCNC: 22 MMOL/L (ref 20–33)
CREAT SERPL-MCNC: 0.77 MG/DL (ref 0.5–1.4)
EOSINOPHIL # BLD AUTO: 0.09 K/UL (ref 0–0.51)
EOSINOPHIL NFR BLD: 0.7 % (ref 0–6.9)
ERYTHROCYTE [DISTWIDTH] IN BLOOD BY AUTOMATED COUNT: 46.5 FL (ref 35.9–50)
GFR SERPLBLD CREATININE-BSD FMLA CKD-EPI: 110 ML/MIN/1.73 M 2
GLOBULIN SER CALC-MCNC: 4.5 G/DL (ref 1.9–3.5)
GLUCOSE SERPL-MCNC: 516 MG/DL (ref 65–99)
HCT VFR BLD AUTO: 43 % (ref 42–52)
HDLC SERPL-MCNC: 45 MG/DL
HGB BLD-MCNC: 14.2 G/DL (ref 14–18)
IMM GRANULOCYTES # BLD AUTO: 0.05 K/UL (ref 0–0.11)
IMM GRANULOCYTES NFR BLD AUTO: 0.4 % (ref 0–0.9)
LDLC SERPL CALC-MCNC: ABNORMAL MG/DL
LYMPHOCYTES # BLD AUTO: 2.21 K/UL (ref 1–4.8)
LYMPHOCYTES NFR BLD: 17.2 % (ref 22–41)
MCH RBC QN AUTO: 31.6 PG (ref 27–33)
MCHC RBC AUTO-ENTMCNC: 33 G/DL (ref 32.3–36.5)
MCV RBC AUTO: 95.6 FL (ref 81.4–97.8)
MONOCYTES # BLD AUTO: 1.14 K/UL (ref 0–0.85)
MONOCYTES NFR BLD AUTO: 8.9 % (ref 0–13.4)
NEUTROPHILS # BLD AUTO: 9.3 K/UL (ref 1.82–7.42)
NEUTROPHILS NFR BLD: 72.2 % (ref 44–72)
NRBC # BLD AUTO: 0 K/UL
NRBC BLD-RTO: 0 /100 WBC (ref 0–0.2)
PLATELET # BLD AUTO: 385 K/UL (ref 164–446)
PMV BLD AUTO: 12.9 FL (ref 9–12.9)
POTASSIUM SERPL-SCNC: 3.6 MMOL/L (ref 3.6–5.5)
PROT SERPL-MCNC: 8.7 G/DL (ref 6–8.2)
PSA SERPL-MCNC: 0.32 NG/ML (ref 0–4)
RBC # BLD AUTO: 4.5 M/UL (ref 4.7–6.1)
SODIUM SERPL-SCNC: 127 MMOL/L (ref 135–145)
TRIGL SERPL-MCNC: 518 MG/DL (ref 0–149)
TSH SERPL DL<=0.005 MIU/L-ACNC: 1.33 UIU/ML (ref 0.38–5.33)
WBC # BLD AUTO: 12.9 K/UL (ref 4.8–10.8)

## 2024-09-05 PROCEDURE — 80053 COMPREHEN METABOLIC PANEL: CPT

## 2024-09-05 PROCEDURE — 85025 COMPLETE CBC W/AUTO DIFF WBC: CPT

## 2024-09-05 PROCEDURE — 80061 LIPID PANEL: CPT

## 2024-09-05 PROCEDURE — 84153 ASSAY OF PSA TOTAL: CPT

## 2024-09-05 PROCEDURE — 36415 COLL VENOUS BLD VENIPUNCTURE: CPT

## 2024-09-05 PROCEDURE — 84443 ASSAY THYROID STIM HORMONE: CPT

## 2024-09-05 PROCEDURE — 83036 HEMOGLOBIN GLYCOSYLATED A1C: CPT

## 2024-09-07 LAB
EST. AVERAGE GLUCOSE BLD GHB EST-MCNC: 461 MG/DL
HBA1C MFR BLD: 17.7 % (ref 4–5.6)

## 2024-09-20 ENCOUNTER — HOSPITAL ENCOUNTER (OUTPATIENT)
Dept: LAB | Facility: MEDICAL CENTER | Age: 49
End: 2024-09-20
Attending: STUDENT IN AN ORGANIZED HEALTH CARE EDUCATION/TRAINING PROGRAM
Payer: COMMERCIAL

## 2024-09-20 LAB
ANION GAP SERPL CALC-SCNC: 16 MMOL/L (ref 7–16)
BUN SERPL-MCNC: 10 MG/DL (ref 8–22)
CALCIUM SERPL-MCNC: 10.2 MG/DL (ref 8.5–10.5)
CHLORIDE SERPL-SCNC: 92 MMOL/L (ref 96–112)
CO2 SERPL-SCNC: 21 MMOL/L (ref 20–33)
CREAT SERPL-MCNC: 0.86 MG/DL (ref 0.5–1.4)
GFR SERPLBLD CREATININE-BSD FMLA CKD-EPI: 106 ML/MIN/1.73 M 2
GLUCOSE SERPL-MCNC: 432 MG/DL (ref 65–99)
POTASSIUM SERPL-SCNC: 4.8 MMOL/L (ref 3.6–5.5)
SODIUM SERPL-SCNC: 129 MMOL/L (ref 135–145)

## 2024-09-20 PROCEDURE — 80048 BASIC METABOLIC PNL TOTAL CA: CPT

## 2024-09-20 PROCEDURE — 36415 COLL VENOUS BLD VENIPUNCTURE: CPT

## 2024-09-20 PROCEDURE — 83525 ASSAY OF INSULIN: CPT

## 2024-09-20 PROCEDURE — 86337 INSULIN ANTIBODIES: CPT

## 2024-09-20 PROCEDURE — 86341 ISLET CELL ANTIBODY: CPT

## 2024-09-22 LAB — INSULIN P FAST SERPL-ACNC: 7 UIU/ML (ref 3–25)

## 2024-09-24 LAB
GAD65 AB SER IA-ACNC: <5 IU/ML (ref 0–5)
INSULIN HUMAN AB SER-ACNC: <0.4 U/ML (ref 0–0.4)
ISLET CELL512 AB SER IA-ACNC: <5.4 U/ML (ref 0–7.4)

## 2024-10-17 LAB — ZNT8 AB SERPL IA-ACNC: <10 U/ML (ref 0–15)

## 2024-11-20 ENCOUNTER — HOSPITAL ENCOUNTER (OUTPATIENT)
Dept: LAB | Facility: MEDICAL CENTER | Age: 49
End: 2024-11-20
Attending: UROLOGY
Payer: COMMERCIAL

## 2024-11-20 ENCOUNTER — HOSPITAL ENCOUNTER (OUTPATIENT)
Dept: RADIOLOGY | Facility: MEDICAL CENTER | Age: 49
End: 2024-11-20
Attending: UROLOGY
Payer: COMMERCIAL

## 2024-11-20 DIAGNOSIS — C62.90 MALIGNANT NEOPLASM OF TESTICLE, UNSPECIFIED LATERALITY, UNSPECIFIED WHETHER DESCENDED OR UNDESCENDED (HCC): ICD-10-CM

## 2024-11-20 LAB
ESTRADIOL SERPL-MCNC: <5 PG/ML
PSA SERPL DL<=0.01 NG/ML-MCNC: 0.38 NG/ML (ref 0–4)
TESTOST SERPL-MCNC: 103 NG/DL (ref 175–781)

## 2024-11-20 PROCEDURE — 71046 X-RAY EXAM CHEST 2 VIEWS: CPT

## 2024-11-20 PROCEDURE — 84403 ASSAY OF TOTAL TESTOSTERONE: CPT

## 2024-11-20 PROCEDURE — 82670 ASSAY OF TOTAL ESTRADIOL: CPT

## 2024-11-20 PROCEDURE — 84153 ASSAY OF PSA TOTAL: CPT

## 2024-11-20 PROCEDURE — 36415 COLL VENOUS BLD VENIPUNCTURE: CPT

## 2024-11-23 ENCOUNTER — HOSPITAL ENCOUNTER (OUTPATIENT)
Dept: RADIOLOGY | Facility: MEDICAL CENTER | Age: 49
End: 2024-11-23
Attending: UROLOGY
Payer: COMMERCIAL

## 2024-11-23 DIAGNOSIS — C62.90 MALIGNANT NEOPLASM OF TESTICLE, UNSPECIFIED LATERALITY, UNSPECIFIED WHETHER DESCENDED OR UNDESCENDED (HCC): ICD-10-CM

## 2024-11-23 PROCEDURE — 71260 CT THORAX DX C+: CPT

## 2024-11-23 PROCEDURE — 700117 HCHG RX CONTRAST REV CODE 255: Performed by: UROLOGY

## 2024-11-23 RX ADMIN — IOHEXOL 100 ML: 350 INJECTION, SOLUTION INTRAVENOUS at 11:47

## 2024-11-25 ENCOUNTER — APPOINTMENT (OUTPATIENT)
Dept: RADIOLOGY | Facility: MEDICAL CENTER | Age: 49
End: 2024-11-25
Attending: EMERGENCY MEDICINE
Payer: COMMERCIAL

## 2024-11-25 ENCOUNTER — HOSPITAL ENCOUNTER (INPATIENT)
Facility: MEDICAL CENTER | Age: 49
LOS: 2 days | End: 2024-11-27
Attending: EMERGENCY MEDICINE | Admitting: STUDENT IN AN ORGANIZED HEALTH CARE EDUCATION/TRAINING PROGRAM
Payer: COMMERCIAL

## 2024-11-25 DIAGNOSIS — I74.5 ILIAC ARTERY OCCLUSION (HCC): ICD-10-CM

## 2024-11-25 DIAGNOSIS — N28.0 RENAL INFARCT (HCC): ICD-10-CM

## 2024-11-25 DIAGNOSIS — I73.9 PVD (PERIPHERAL VASCULAR DISEASE) WITH CLAUDICATION (HCC): ICD-10-CM

## 2024-11-25 PROBLEM — G62.9 NEUROPATHY: Status: ACTIVE | Noted: 2024-11-25

## 2024-11-25 PROBLEM — E11.9 DM (DIABETES MELLITUS) (HCC): Status: ACTIVE | Noted: 2024-11-25

## 2024-11-25 PROBLEM — Z71.6 TOBACCO ABUSE COUNSELING: Status: ACTIVE | Noted: 2024-11-25

## 2024-11-25 LAB
ALBUMIN SERPL BCP-MCNC: 4.3 G/DL (ref 3.2–4.9)
ALBUMIN/GLOB SERPL: 1.2 G/DL
ALP SERPL-CCNC: 163 U/L (ref 30–99)
ALT SERPL-CCNC: 13 U/L (ref 2–50)
ANION GAP SERPL CALC-SCNC: 14 MMOL/L (ref 7–16)
APTT PPP: 24.8 SEC (ref 24.7–36)
APTT PPP: 26.1 SEC (ref 24.7–36)
AST SERPL-CCNC: 25 U/L (ref 12–45)
BASOPHILS # BLD AUTO: 0.7 % (ref 0–1.8)
BASOPHILS # BLD: 0.07 K/UL (ref 0–0.12)
BILIRUB SERPL-MCNC: 0.3 MG/DL (ref 0.1–1.5)
BUN SERPL-MCNC: 9 MG/DL (ref 8–22)
CALCIUM ALBUM COR SERPL-MCNC: 9.2 MG/DL (ref 8.5–10.5)
CALCIUM SERPL-MCNC: 9.4 MG/DL (ref 8.5–10.5)
CHLORIDE SERPL-SCNC: 103 MMOL/L (ref 96–112)
CO2 SERPL-SCNC: 20 MMOL/L (ref 20–33)
CREAT SERPL-MCNC: 0.73 MG/DL (ref 0.5–1.4)
EOSINOPHIL # BLD AUTO: 0.36 K/UL (ref 0–0.51)
EOSINOPHIL NFR BLD: 3.4 % (ref 0–6.9)
ERYTHROCYTE [DISTWIDTH] IN BLOOD BY AUTOMATED COUNT: 45.6 FL (ref 35.9–50)
GFR SERPLBLD CREATININE-BSD FMLA CKD-EPI: 112 ML/MIN/1.73 M 2
GLOBULIN SER CALC-MCNC: 3.5 G/DL (ref 1.9–3.5)
GLUCOSE BLD STRIP.AUTO-MCNC: 175 MG/DL (ref 65–99)
GLUCOSE SERPL-MCNC: 117 MG/DL (ref 65–99)
HCT VFR BLD AUTO: 43 % (ref 42–52)
HGB BLD-MCNC: 14.4 G/DL (ref 14–18)
IMM GRANULOCYTES # BLD AUTO: 0.03 K/UL (ref 0–0.11)
IMM GRANULOCYTES NFR BLD AUTO: 0.3 % (ref 0–0.9)
INR PPP: 0.95 (ref 0.87–1.13)
INR PPP: 0.95 (ref 0.87–1.13)
LACTATE SERPL-SCNC: 0.8 MMOL/L (ref 0.5–2)
LYMPHOCYTES # BLD AUTO: 3.6 K/UL (ref 1–4.8)
LYMPHOCYTES NFR BLD: 33.8 % (ref 22–41)
MCH RBC QN AUTO: 31.2 PG (ref 27–33)
MCHC RBC AUTO-ENTMCNC: 33.5 G/DL (ref 32.3–36.5)
MCV RBC AUTO: 93.1 FL (ref 81.4–97.8)
MONOCYTES # BLD AUTO: 0.86 K/UL (ref 0–0.85)
MONOCYTES NFR BLD AUTO: 8.1 % (ref 0–13.4)
NEUTROPHILS # BLD AUTO: 5.74 K/UL (ref 1.82–7.42)
NEUTROPHILS NFR BLD: 53.7 % (ref 44–72)
NRBC # BLD AUTO: 0 K/UL
NRBC BLD-RTO: 0 /100 WBC (ref 0–0.2)
PLATELET # BLD AUTO: 383 K/UL (ref 164–446)
PMV BLD AUTO: 10.3 FL (ref 9–12.9)
POTASSIUM SERPL-SCNC: 3.8 MMOL/L (ref 3.6–5.5)
PROT SERPL-MCNC: 7.8 G/DL (ref 6–8.2)
PROTHROMBIN TIME: 12.6 SEC (ref 12–14.6)
PROTHROMBIN TIME: 12.6 SEC (ref 12–14.6)
RBC # BLD AUTO: 4.62 M/UL (ref 4.7–6.1)
SODIUM SERPL-SCNC: 137 MMOL/L (ref 135–145)
TROPONIN T SERPL-MCNC: <6 NG/L (ref 6–19)
UFH PPP CHRO-ACNC: <0.1 IU/ML
WBC # BLD AUTO: 10.7 K/UL (ref 4.8–10.8)

## 2024-11-25 PROCEDURE — 770020 HCHG ROOM/CARE - TELE (206)

## 2024-11-25 PROCEDURE — 84484 ASSAY OF TROPONIN QUANT: CPT

## 2024-11-25 PROCEDURE — 99291 CRITICAL CARE FIRST HOUR: CPT | Performed by: STUDENT IN AN ORGANIZED HEALTH CARE EDUCATION/TRAINING PROGRAM

## 2024-11-25 PROCEDURE — 93922 UPR/L XTREMITY ART 2 LEVELS: CPT

## 2024-11-25 PROCEDURE — 36415 COLL VENOUS BLD VENIPUNCTURE: CPT

## 2024-11-25 PROCEDURE — 83605 ASSAY OF LACTIC ACID: CPT

## 2024-11-25 PROCEDURE — 93926 LOWER EXTREMITY STUDY: CPT | Mod: LT

## 2024-11-25 PROCEDURE — A9270 NON-COVERED ITEM OR SERVICE: HCPCS | Performed by: STUDENT IN AN ORGANIZED HEALTH CARE EDUCATION/TRAINING PROGRAM

## 2024-11-25 PROCEDURE — 99285 EMERGENCY DEPT VISIT HI MDM: CPT

## 2024-11-25 PROCEDURE — 80053 COMPREHEN METABOLIC PANEL: CPT

## 2024-11-25 PROCEDURE — 85520 HEPARIN ASSAY: CPT

## 2024-11-25 PROCEDURE — 700102 HCHG RX REV CODE 250 W/ 637 OVERRIDE(OP): Performed by: STUDENT IN AN ORGANIZED HEALTH CARE EDUCATION/TRAINING PROGRAM

## 2024-11-25 PROCEDURE — 96365 THER/PROPH/DIAG IV INF INIT: CPT

## 2024-11-25 PROCEDURE — 85025 COMPLETE CBC W/AUTO DIFF WBC: CPT

## 2024-11-25 PROCEDURE — 85730 THROMBOPLASTIN TIME PARTIAL: CPT

## 2024-11-25 PROCEDURE — 85610 PROTHROMBIN TIME: CPT

## 2024-11-25 PROCEDURE — 82962 GLUCOSE BLOOD TEST: CPT

## 2024-11-25 PROCEDURE — 700111 HCHG RX REV CODE 636 W/ 250 OVERRIDE (IP): Performed by: STUDENT IN AN ORGANIZED HEALTH CARE EDUCATION/TRAINING PROGRAM

## 2024-11-25 RX ORDER — HEPARIN SODIUM 1000 [USP'U]/ML
40 INJECTION, SOLUTION INTRAVENOUS; SUBCUTANEOUS PRN
Status: DISCONTINUED | OUTPATIENT
Start: 2024-11-25 | End: 2024-11-25

## 2024-11-25 RX ORDER — PROCHLORPERAZINE EDISYLATE 5 MG/ML
5-10 INJECTION INTRAMUSCULAR; INTRAVENOUS EVERY 4 HOURS PRN
Status: DISCONTINUED | OUTPATIENT
Start: 2024-11-25 | End: 2024-11-27 | Stop reason: HOSPADM

## 2024-11-25 RX ORDER — HEPARIN SODIUM 1000 [USP'U]/ML
40 INJECTION, SOLUTION INTRAVENOUS; SUBCUTANEOUS PRN
Status: DISCONTINUED | OUTPATIENT
Start: 2024-11-25 | End: 2024-11-26

## 2024-11-25 RX ORDER — ATORVASTATIN CALCIUM 40 MG/1
40 TABLET, FILM COATED ORAL EVERY EVENING
Status: DISCONTINUED | OUTPATIENT
Start: 2024-11-25 | End: 2024-11-27 | Stop reason: HOSPADM

## 2024-11-25 RX ORDER — GUAIFENESIN/DEXTROMETHORPHAN 100-10MG/5
10 SYRUP ORAL EVERY 6 HOURS PRN
Status: DISCONTINUED | OUTPATIENT
Start: 2024-11-25 | End: 2024-11-27 | Stop reason: HOSPADM

## 2024-11-25 RX ORDER — TESTOSTERONE CYPIONATE 200 MG/ML
200 INJECTION, SOLUTION INTRAMUSCULAR
COMMUNITY

## 2024-11-25 RX ORDER — ONDANSETRON 4 MG/1
4 TABLET, ORALLY DISINTEGRATING ORAL EVERY 4 HOURS PRN
Status: DISCONTINUED | OUTPATIENT
Start: 2024-11-25 | End: 2024-11-27 | Stop reason: HOSPADM

## 2024-11-25 RX ORDER — LABETALOL HYDROCHLORIDE 5 MG/ML
10 INJECTION, SOLUTION INTRAVENOUS EVERY 4 HOURS PRN
Status: DISCONTINUED | OUTPATIENT
Start: 2024-11-25 | End: 2024-11-27 | Stop reason: HOSPADM

## 2024-11-25 RX ORDER — GABAPENTIN 300 MG/1
300 CAPSULE ORAL 2 TIMES DAILY
COMMUNITY

## 2024-11-25 RX ORDER — ASPIRIN 81 MG/1
81 TABLET ORAL DAILY
Status: DISCONTINUED | OUTPATIENT
Start: 2024-11-26 | End: 2024-11-27 | Stop reason: HOSPADM

## 2024-11-25 RX ORDER — PROMETHAZINE HYDROCHLORIDE 25 MG/1
12.5-25 TABLET ORAL EVERY 4 HOURS PRN
Status: DISCONTINUED | OUTPATIENT
Start: 2024-11-25 | End: 2024-11-27 | Stop reason: HOSPADM

## 2024-11-25 RX ORDER — HEPARIN SODIUM 5000 [USP'U]/100ML
0-30 INJECTION, SOLUTION INTRAVENOUS CONTINUOUS
Status: DISCONTINUED | OUTPATIENT
Start: 2024-11-25 | End: 2024-11-25

## 2024-11-25 RX ORDER — ACETAMINOPHEN 325 MG/1
650 TABLET ORAL EVERY 6 HOURS PRN
Status: DISCONTINUED | OUTPATIENT
Start: 2024-11-25 | End: 2024-11-27 | Stop reason: HOSPADM

## 2024-11-25 RX ORDER — ONDANSETRON 2 MG/ML
4 INJECTION INTRAMUSCULAR; INTRAVENOUS EVERY 4 HOURS PRN
Status: DISCONTINUED | OUTPATIENT
Start: 2024-11-25 | End: 2024-11-27 | Stop reason: HOSPADM

## 2024-11-25 RX ORDER — PROMETHAZINE HYDROCHLORIDE 25 MG/1
12.5-25 SUPPOSITORY RECTAL EVERY 4 HOURS PRN
Status: DISCONTINUED | OUTPATIENT
Start: 2024-11-25 | End: 2024-11-27 | Stop reason: HOSPADM

## 2024-11-25 RX ORDER — DEXTROSE MONOHYDRATE 25 G/50ML
25 INJECTION, SOLUTION INTRAVENOUS
Status: DISCONTINUED | OUTPATIENT
Start: 2024-11-25 | End: 2024-11-27 | Stop reason: HOSPADM

## 2024-11-25 RX ORDER — HEPARIN SODIUM 5000 [USP'U]/100ML
0-30 INJECTION, SOLUTION INTRAVENOUS CONTINUOUS
Status: DISCONTINUED | OUTPATIENT
Start: 2024-11-25 | End: 2024-11-26

## 2024-11-25 RX ORDER — GABAPENTIN 300 MG/1
300 CAPSULE ORAL 2 TIMES DAILY
Status: DISCONTINUED | OUTPATIENT
Start: 2024-11-25 | End: 2024-11-27 | Stop reason: HOSPADM

## 2024-11-25 RX ORDER — DAPAGLIFLOZIN 10 MG/1
10 TABLET, FILM COATED ORAL DAILY
COMMUNITY

## 2024-11-25 RX ORDER — IBUPROFEN 200 MG
400-600 TABLET ORAL
COMMUNITY

## 2024-11-25 RX ORDER — SODIUM CHLORIDE, SODIUM LACTATE, POTASSIUM CHLORIDE, AND CALCIUM CHLORIDE .6; .31; .03; .02 G/100ML; G/100ML; G/100ML; G/100ML
500 INJECTION, SOLUTION INTRAVENOUS
Status: DISCONTINUED | OUTPATIENT
Start: 2024-11-25 | End: 2024-11-27 | Stop reason: HOSPADM

## 2024-11-25 RX ORDER — HEPARIN SODIUM 1000 [USP'U]/ML
80 INJECTION, SOLUTION INTRAVENOUS; SUBCUTANEOUS ONCE
Status: DISCONTINUED | OUTPATIENT
Start: 2024-11-25 | End: 2024-11-25

## 2024-11-25 RX ORDER — INSULIN LISPRO 100 [IU]/ML
2-9 INJECTION, SOLUTION INTRAVENOUS; SUBCUTANEOUS
Status: DISCONTINUED | OUTPATIENT
Start: 2024-11-25 | End: 2024-11-27 | Stop reason: HOSPADM

## 2024-11-25 RX ADMIN — HEPARIN SODIUM 18 UNITS/KG/HR: 5000 INJECTION, SOLUTION INTRAVENOUS at 19:31

## 2024-11-25 RX ADMIN — ATORVASTATIN CALCIUM 40 MG: 40 TABLET, FILM COATED ORAL at 19:35

## 2024-11-25 RX ADMIN — GABAPENTIN 300 MG: 300 CAPSULE ORAL at 19:35

## 2024-11-25 RX ADMIN — INSULIN LISPRO 2 UNITS: 100 INJECTION, SOLUTION INTRAVENOUS; SUBCUTANEOUS at 22:14

## 2024-11-25 ASSESSMENT — ENCOUNTER SYMPTOMS
NAUSEA: 0
FEVER: 0
DOUBLE VISION: 0
SHORTNESS OF BREATH: 0
CHILLS: 0
DEPRESSION: 0
HEARTBURN: 0
HEADACHES: 0
ABDOMINAL PAIN: 0
FOCAL WEAKNESS: 0
MYALGIAS: 1
PALPITATIONS: 0
WEAKNESS: 1
COUGH: 0
DIZZINESS: 0
BLURRED VISION: 0
VOMITING: 0
BRUISES/BLEEDS EASILY: 0

## 2024-11-25 ASSESSMENT — FIBROSIS 4 INDEX
FIB4 SCORE: 0.43
FIB4 SCORE: 0.87

## 2024-11-25 ASSESSMENT — PATIENT HEALTH QUESTIONNAIRE - PHQ9
SUM OF ALL RESPONSES TO PHQ9 QUESTIONS 1 AND 2: 0
1. LITTLE INTEREST OR PLEASURE IN DOING THINGS: NOT AT ALL
2. FEELING DOWN, DEPRESSED, IRRITABLE, OR HOPELESS: NOT AT ALL

## 2024-11-25 ASSESSMENT — LIFESTYLE VARIABLES: SUBSTANCE_ABUSE: 0

## 2024-11-25 NOTE — ED TRIAGE NOTES
Ambulates to triage  Chief Complaint   Patient presents with    Sent by MD     Abnormal CT chest abd pelvis, yesterday     Pt has a hx of testicular cancer and guillain-barre, had his annual CT yesterday and was told to come to the ER because it showed L illiac occlusion. Pt denies any L leg pain or acute numbness, denies any foot discoloration. Pt has no new pains.

## 2024-11-26 LAB
ALBUMIN SERPL BCP-MCNC: 3.9 G/DL (ref 3.2–4.9)
BASOPHILS # BLD AUTO: 0.7 % (ref 0–1.8)
BASOPHILS # BLD: 0.08 K/UL (ref 0–0.12)
BUN SERPL-MCNC: 14 MG/DL (ref 8–22)
CALCIUM ALBUM COR SERPL-MCNC: 9.2 MG/DL (ref 8.5–10.5)
CALCIUM SERPL-MCNC: 9.1 MG/DL (ref 8.5–10.5)
CHLORIDE SERPL-SCNC: 101 MMOL/L (ref 96–112)
CO2 SERPL-SCNC: 21 MMOL/L (ref 20–33)
CREAT SERPL-MCNC: 0.56 MG/DL (ref 0.5–1.4)
EOSINOPHIL # BLD AUTO: 0.43 K/UL (ref 0–0.51)
EOSINOPHIL NFR BLD: 3.8 % (ref 0–6.9)
ERYTHROCYTE [DISTWIDTH] IN BLOOD BY AUTOMATED COUNT: 46.9 FL (ref 35.9–50)
GFR SERPLBLD CREATININE-BSD FMLA CKD-EPI: 121 ML/MIN/1.73 M 2
GLUCOSE BLD STRIP.AUTO-MCNC: 138 MG/DL (ref 65–99)
GLUCOSE BLD STRIP.AUTO-MCNC: 146 MG/DL (ref 65–99)
GLUCOSE BLD STRIP.AUTO-MCNC: 158 MG/DL (ref 65–99)
GLUCOSE BLD STRIP.AUTO-MCNC: 186 MG/DL (ref 65–99)
GLUCOSE SERPL-MCNC: 139 MG/DL (ref 65–99)
HCT VFR BLD AUTO: 42.1 % (ref 42–52)
HGB BLD-MCNC: 14.4 G/DL (ref 14–18)
IMM GRANULOCYTES # BLD AUTO: 0.04 K/UL (ref 0–0.11)
IMM GRANULOCYTES NFR BLD AUTO: 0.4 % (ref 0–0.9)
LYMPHOCYTES # BLD AUTO: 3.5 K/UL (ref 1–4.8)
LYMPHOCYTES NFR BLD: 31.2 % (ref 22–41)
MAGNESIUM SERPL-MCNC: 2.1 MG/DL (ref 1.5–2.5)
MCH RBC QN AUTO: 32.4 PG (ref 27–33)
MCHC RBC AUTO-ENTMCNC: 34.2 G/DL (ref 32.3–36.5)
MCV RBC AUTO: 94.6 FL (ref 81.4–97.8)
MONOCYTES # BLD AUTO: 0.89 K/UL (ref 0–0.85)
MONOCYTES NFR BLD AUTO: 7.9 % (ref 0–13.4)
NEUTROPHILS # BLD AUTO: 6.27 K/UL (ref 1.82–7.42)
NEUTROPHILS NFR BLD: 56 % (ref 44–72)
NRBC # BLD AUTO: 0 K/UL
NRBC BLD-RTO: 0 /100 WBC (ref 0–0.2)
PHOSPHATE SERPL-MCNC: 3.1 MG/DL (ref 2.5–4.5)
PLATELET # BLD AUTO: 350 K/UL (ref 164–446)
PMV BLD AUTO: 10.6 FL (ref 9–12.9)
POTASSIUM SERPL-SCNC: 4.1 MMOL/L (ref 3.6–5.5)
RBC # BLD AUTO: 4.45 M/UL (ref 4.7–6.1)
SODIUM SERPL-SCNC: 135 MMOL/L (ref 135–145)
UFH PPP CHRO-ACNC: 0.13 IU/ML
UFH PPP CHRO-ACNC: 0.4 IU/ML
UFH PPP CHRO-ACNC: 0.58 IU/ML
WBC # BLD AUTO: 11.2 K/UL (ref 4.8–10.8)

## 2024-11-26 PROCEDURE — 82962 GLUCOSE BLOOD TEST: CPT

## 2024-11-26 PROCEDURE — 700111 HCHG RX REV CODE 636 W/ 250 OVERRIDE (IP): Performed by: STUDENT IN AN ORGANIZED HEALTH CARE EDUCATION/TRAINING PROGRAM

## 2024-11-26 PROCEDURE — 99233 SBSQ HOSP IP/OBS HIGH 50: CPT | Performed by: STUDENT IN AN ORGANIZED HEALTH CARE EDUCATION/TRAINING PROGRAM

## 2024-11-26 PROCEDURE — 770020 HCHG ROOM/CARE - TELE (206)

## 2024-11-26 PROCEDURE — 85025 COMPLETE CBC W/AUTO DIFF WBC: CPT

## 2024-11-26 PROCEDURE — 36415 COLL VENOUS BLD VENIPUNCTURE: CPT

## 2024-11-26 PROCEDURE — A9270 NON-COVERED ITEM OR SERVICE: HCPCS

## 2024-11-26 PROCEDURE — A9270 NON-COVERED ITEM OR SERVICE: HCPCS | Performed by: STUDENT IN AN ORGANIZED HEALTH CARE EDUCATION/TRAINING PROGRAM

## 2024-11-26 PROCEDURE — 80069 RENAL FUNCTION PANEL: CPT

## 2024-11-26 PROCEDURE — 99222 1ST HOSP IP/OBS MODERATE 55: CPT | Performed by: SURGERY

## 2024-11-26 PROCEDURE — 700102 HCHG RX REV CODE 250 W/ 637 OVERRIDE(OP)

## 2024-11-26 PROCEDURE — 83735 ASSAY OF MAGNESIUM: CPT

## 2024-11-26 PROCEDURE — 85520 HEPARIN ASSAY: CPT | Mod: 91

## 2024-11-26 PROCEDURE — 700102 HCHG RX REV CODE 250 W/ 637 OVERRIDE(OP): Performed by: STUDENT IN AN ORGANIZED HEALTH CARE EDUCATION/TRAINING PROGRAM

## 2024-11-26 RX ORDER — NICOTINE 21 MG/24HR
14 PATCH, TRANSDERMAL 24 HOURS TRANSDERMAL
Status: DISCONTINUED | OUTPATIENT
Start: 2024-11-26 | End: 2024-11-27 | Stop reason: HOSPADM

## 2024-11-26 RX ADMIN — APIXABAN 5 MG: 5 TABLET, FILM COATED ORAL at 17:22

## 2024-11-26 RX ADMIN — ATORVASTATIN CALCIUM 40 MG: 40 TABLET, FILM COATED ORAL at 17:11

## 2024-11-26 RX ADMIN — HEPARIN SODIUM 2300 UNITS: 1000 INJECTION, SOLUTION INTRAVENOUS; SUBCUTANEOUS at 02:15

## 2024-11-26 RX ADMIN — ACETAMINOPHEN 650 MG: 325 TABLET ORAL at 09:39

## 2024-11-26 RX ADMIN — INSULIN LISPRO 2 UNITS: 100 INJECTION, SOLUTION INTRAVENOUS; SUBCUTANEOUS at 21:03

## 2024-11-26 RX ADMIN — HEPARIN SODIUM 22 UNITS/KG/HR: 5000 INJECTION, SOLUTION INTRAVENOUS at 02:15

## 2024-11-26 RX ADMIN — ASPIRIN 81 MG: 81 TABLET, COATED ORAL at 06:19

## 2024-11-26 RX ADMIN — HEPARIN SODIUM 22 UNITS/KG/HR: 5000 INJECTION, SOLUTION INTRAVENOUS at 14:36

## 2024-11-26 RX ADMIN — GABAPENTIN 300 MG: 300 CAPSULE ORAL at 06:19

## 2024-11-26 RX ADMIN — GABAPENTIN 300 MG: 300 CAPSULE ORAL at 17:11

## 2024-11-26 RX ADMIN — NICOTINE 14 MG: 14 PATCH TRANSDERMAL at 09:38

## 2024-11-26 RX ADMIN — GUAIFENESIN SYRUP AND DEXTROMETHORPHAN 10 ML: 100; 10 SYRUP ORAL at 21:09

## 2024-11-26 SDOH — ECONOMIC STABILITY: TRANSPORTATION INSECURITY
IN THE PAST 12 MONTHS, HAS LACK OF RELIABLE TRANSPORTATION KEPT YOU FROM MEDICAL APPOINTMENTS, MEETINGS, WORK OR FROM GETTING THINGS NEEDED FOR DAILY LIVING?: NO

## 2024-11-26 SDOH — ECONOMIC STABILITY: TRANSPORTATION INSECURITY
IN THE PAST 12 MONTHS, HAS THE LACK OF TRANSPORTATION KEPT YOU FROM MEDICAL APPOINTMENTS OR FROM GETTING MEDICATIONS?: NO

## 2024-11-26 ASSESSMENT — ENCOUNTER SYMPTOMS
DIARRHEA: 0
BLURRED VISION: 0
HEADACHES: 0
WEIGHT LOSS: 0
NAUSEA: 0
PALPITATIONS: 0
MYALGIAS: 0
SPUTUM PRODUCTION: 0
FEVER: 0
CHILLS: 0
VOMITING: 0
DEPRESSION: 0
HEARTBURN: 0
COUGH: 0
DIZZINESS: 0

## 2024-11-26 ASSESSMENT — LIFESTYLE VARIABLES
ALCOHOL_USE: NO
EVER HAD A DRINK FIRST THING IN THE MORNING TO STEADY YOUR NERVES TO GET RID OF A HANGOVER: NO
HAVE PEOPLE ANNOYED YOU BY CRITICIZING YOUR DRINKING: NO
TOTAL SCORE: 0
CONSUMPTION TOTAL: INCOMPLETE
DOES PATIENT WANT TO STOP DRINKING: NO
TOTAL SCORE: 0
TOTAL SCORE: 0
HAVE YOU EVER FELT YOU SHOULD CUT DOWN ON YOUR DRINKING: NO
EVER FELT BAD OR GUILTY ABOUT YOUR DRINKING: NO

## 2024-11-26 ASSESSMENT — COGNITIVE AND FUNCTIONAL STATUS - GENERAL
SUGGESTED CMS G CODE MODIFIER MOBILITY: CH
DAILY ACTIVITIY SCORE: 24
MOBILITY SCORE: 24
SUGGESTED CMS G CODE MODIFIER DAILY ACTIVITY: CH
SUGGESTED CMS G CODE MODIFIER DAILY ACTIVITY: CH
DAILY ACTIVITIY SCORE: 24

## 2024-11-26 ASSESSMENT — SOCIAL DETERMINANTS OF HEALTH (SDOH)

## 2024-11-26 ASSESSMENT — FIBROSIS 4 INDEX
FIB4 SCORE: 0.87
FIB4 SCORE: 0.87

## 2024-11-26 ASSESSMENT — PAIN DESCRIPTION - PAIN TYPE
TYPE: ACUTE PAIN
TYPE: ACUTE PAIN

## 2024-11-26 NOTE — ED NOTES
Call to lab to inquire on eta for Xa.  Lab reports heparin xa order was d/c'd.  Spoke with ED pharmacist.  New order placed, lab to add on.

## 2024-11-26 NOTE — PROGRESS NOTES
4 Eyes Skin Assessment Completed by JILLIAN Banegas and JILLIAN Hermosillo.    Head WDL  Ears WDL  Nose WDL  Mouth WDL  Neck WDL  Breast/Chest WDL  Shoulder Blades WDL  Spine WDL  (R) Arm/Elbow/Hand Abrasion  (L) Arm/Elbow/Hand WDL  Abdomen WDL  Groin WDL  Scrotum/Coccyx/Buttocks WDL  (R) Leg WDL  (L) Leg WDL  (R) Heel/Foot/Toe WDL  (L) Heel/Foot/Toe WDL          Devices In Places Tele Box      Interventions In Place Pillows    Possible Skin Injury No    Pictures Uploaded Into Epic Yes   Wound Consult Placed N/A  RN Wound Prevention Protocol Ordered No

## 2024-11-26 NOTE — ED NOTES
Lab states they will run Xa at this time.  Report to Shantell WALSH.  Attempt to call report to the floor.

## 2024-11-26 NOTE — CARE PLAN
The patient is Stable - Low risk of patient condition declining or worsening    Shift Goals  Clinical Goals: Heparin XA, Vascular consult  Patient Goals: Rest  Family Goals: BOB    Progress made toward(s) clinical / shift goals:        Problem: Knowledge Deficit - Standard  Goal: Patient and family/care givers will demonstrate understanding of plan of care, disease process/condition, diagnostic tests and medications  Outcome: Progressing  Note: 1. Patient and family/caregiver oriented to unit, equipment, visitation policy and means for communicating concern 2. Complete/review Learning Assessment 3. Assess knowledge level of disease process/condition, treatment plan, diagnostic tests and medications 4. Explain disease process/condition, treatment plan, diagnostic tests and medications      Problem: Urinary - Renal Perfusion  Goal: Ability to achieve and maintain adequate renal perfusion and functioning will improve  Outcome: Progressing  Note: 1. Urine output will remain greater than 0.5ml/Kg/HR 2. Monitor amount and/or characteristics of urine per order/policy. Specific gravity per order/policy 3. Assess signs and symptoms of renal dysfunction        Patient is not progressing towards the following goals:

## 2024-11-26 NOTE — ED NOTES
Med rec is complete per Patient at bedside.     Allergies reviewed.    Has patient had any outside antibiotics in the last 30 days? N    Any Anticoagulants (rivaroxaban, apixaban, edoxaban, dabigatran, warfarin, enoxaparin) taken in the last 14 days? N         Pharmacy/Pharmacies Pt utilizes : [facility] [phone]

## 2024-11-26 NOTE — ED PROVIDER NOTES
ED Provider Note    CHIEF COMPLAINT  Chief Complaint   Patient presents with    Sent by MD     Abnormal CT chest abd pelvis, yesterday       EXTERNAL RECORDS REVIEWED  External support patient has a history of testicular cancer and had a CT performed to evaluate for progression of disease    CTa abd pelvis -      IMPRESSION:        1. Multiple small old bilateral renal infarcts, new since 6/2/2022. 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 These could be all related to episodes of emboli, possibly cardiac or elsewhere in origin. The patient does   have atherosclerosis. Clinical correlation needed.     2. Mild bladder wall thickening. Cystitis possible although this may be related to incomplete distention. No stones or hydronephrosis.     3. Healing, subacute right 8th and 9th rib fractures laterally. These were not present on 6/2/2022 CT.     4. No evidence for metastatic disease in the chest, abdomen or pelvis.     5. Stable liver lesions, both considered benign.    HPI/ROS  LIMITATION TO HISTORY   Select: : None  OUTSIDE HISTORIAN(S):  gaurav    Charanjit Morin  is a 48 y.o. male who presents to the emergency department stating he was told to come in for a CT finding.  He states he is having no testicular pain he has no abdominal pain he states sometimes his legs cramp but he also has a history of Guillain-Barré and so he is on gabapentin but he states he still has numbness on the bottom of his feet so he is not sure if that is due to the Guillain-Barré or any issue with his vasculature.  No chest pain no shortness of breath he is not on blood thinners he states if he was not called the company would not of come in because he otherwise feels well    PAST MEDICAL HISTORY   has a past medical history of Cancer (HCC) (2000), Cancer (HCC) (2020), Heart burn, radiation therapy, Renal disorder, and Snoring.    SURGICAL HISTORY   has a past surgical history that includes orchiectomy  (Left) and orchiectomy (Right, 11/22/2020).    FAMILY HISTORY  Family History   Problem Relation Age of Onset    Cancer Mother         ovarian cancer    Cancer Maternal Grandfather         prostate cancer       SOCIAL HISTORY  Social History     Tobacco Use    Smoking status: Every Day     Current packs/day: 0.50     Average packs/day: 0.5 packs/day for 20.0 years (10.0 ttl pk-yrs)     Types: Cigarettes    Smokeless tobacco: Never    Tobacco comments:     not recent    Vaping Use    Vaping status: Never Used   Substance and Sexual Activity    Alcohol use: Yes     Comment: 4 drinks per day     Drug use: Yes     Types: Inhaled, Marijuana     Comment: marijuana daily     Sexual activity: Not on file       CURRENT MEDICATIONS  Home Medications       Reviewed by Renuka Ruiz R.N. (Registered Nurse) on 11/25/24 at 1400  Med List Status: Partial     Medication Last Dose Status   acetaminophen (TYLENOL) 500 MG Tab  Active   bismuth subsalicylate (PEPTO-BISMOL) 262 MG/15ML Suspension  Active   Dapagliflozin Propanediol (FARXIGA PO)  Active   gabapentin (NEURONTIN) 300 MG Cap  Active   METFORMIN HCL PO  Active   testosterone cypionate (DEPO-TESTOSTERONE) 200 MG/ML injection  Active                    ALLERGIES  No Known Allergies    PHYSICAL EXAM  VITAL SIGNS: /83   Pulse (!) 108   Temp 36.6 °C (97.8 °F) (Temporal)   Resp 18   Wt 57.9 kg (127 lb 10.3 oz)   SpO2 94%   BMI 20.60 kg/m²    Pulse OX: Pulse Oxygen level is within normal limit  Constitutional: Alert in no apparent distress.  HENT: Normocephalic, Atraumatic, MMM  Eyes: PERound. Conjunctiva normal, non-icteric.   Heart: Regular rate and rhythm, intact distal pulses   Lungs: Symmetrical movement, no resp distress clear to auscultation bilaterally  Abdomen: Non-tender, non-distended, normal bowel sounds  EXT/Back DP pulses 2+ bilaterally no edema cap refill approximately 3 seconds bilateral big toes  Skin: Warm, Dry, No erythema, No rash.    Neurologic: Alert and oriented, Grossly non-focal.       EKG/LABS  Labs Reviewed   CBC WITH DIFFERENTIAL - Abnormal; Notable for the following components:       Result Value    RBC 4.62 (*)     Monos (Absolute) 0.86 (*)     All other components within normal limits   COMP METABOLIC PANEL - Abnormal; Notable for the following components:    Glucose 117 (*)     Alkaline Phosphatase 163 (*)     All other components within normal limits   POCT GLUCOSE DEVICE RESULTS - Abnormal; Notable for the following components:    POC Glucose, Blood 175 (*)     All other components within normal limits   PROTHROMBIN TIME   APTT   TROPONIN   ESTIMATED GFR   LACTIC ACID   PROTHROMBIN TIME   APTT   HEPARIN XA (UNFRACTIONATED)   URINALYSIS     No results found for this or any previous visit.    I have independently interpreted this EKG    RADIOLOGY/PROCEDURES   I have independently interpreted the diagnostic imaging associated with this visit and am waiting the final reading from the radiologist.   My preliminary interpretation is as follows:         Radiologist interpretation:  US-JORGITO SINGLE LEVEL BILAT   Final Result      US-EXTREMITY ARTERY LOWER UNILAT LEFT   Final Result           Findings   Right.    Doppler waveforms of the common femoral, popliteal, posterior tibial, and    dorsalis pedis arteries are of high amplitude and multiphasic.    Doppler waveforms at the ankle are brisk and triphasic.    The ankle-brachial index is normal.       Left.    Doppler waveforms of the common femoral, popliteal, posterior tibial, and    dorsalis pedis arteries are of high amplitude and multiphasic.    Doppler waveforms at the ankle are dampened and monophasic.    The ankle-brachial index is mildly reduced.       An arterial duplex was performed in accordance with lower extremity    arterial evaluation protocol - see separate report.      FINDINGS   Left.    Diffuse plaque and multiphasic flow throughout the common femoral,    superficial  femoral, and popliteal arteries.    Three vessel runoff to the ankle with monophasic flow.    Additional velocities were obtained:   Left EIA Dist: 74 cm/sec.   Left EIA Prox: 56 cm/sec.   Left KHURRAM Dist: 52 cm/sec.   Left KHURRAM Prox: 102 cm/sec.     COURSE & MEDICAL DECISION MAKING    ASSESSMENT, COURSE AND PLAN  Care Narrative:     Patient is a 48-year-old male with an outpatient imaging of a complete occlusion of left common iliac with normal pulses on my exam and good cap refill there is no erythema there is no signs of purple or black toes there is no significant swelling.  Ultrasound of the artery on the bilateral legs especially on the left was ordered at this time as well as laboratory analysis.  I suspect he will likely need to be hospitalized because there is evidence of infarcts of the renal system which may require an echocardiogram to ensure he is not throwing clots      DISPOSITION AND DISCUSSIONS  6:00 PM  Spoke yuniel Townsend on-call for vascular at this time and agrees with me this is not emergent for him to see but he will see him while he is hospitalized    I have ever started the patient on a heparin drip especially in light of his coagulation and sites of throwing renal clots as well.    6:15 PM  Spoke w Dr dickinson with the medicine service and he has accepted the patient for hospitalization for further evaluation    I have discussed management of the patient with the following physicians and ERICKSON's:  Omega Townsend    Discussion of management with other [facility] or appropriate source(s): Pharmacy for heparin gtt        FINAL DIAGNOSIS  1. Iliac artery occlusion (HCC)    2. Renal infarct (HCC)         Electronically signed by: Paola Parekh M.D., 11/25/2024 4:03 PM

## 2024-11-26 NOTE — ASSESSMENT & PLAN NOTE
CT chest abdomen pelvis 11/23/2024: Complete occlusion of left common iliac artery  Ultrasound arterial duplex of LLE 11/25/2024: Atherosclerotic changes, concern for arterial occlusion  JORGITO right 0.7, JORGITO left 1.0    - Vascular consulted, appreciate recs - for medical management given likely chronic asymptomatic thrombus.  - switch from heparin drip to apixaban 5mg bid  -Monitor for bleeding risk  - outpatient work up of thrombophilia  ASA/statin for now  Supportive pain control  Smoking cessation strongly advised    Vascular surgery consulted, follow-up appreciated  Admit to telemetry unit for close hemodynamic monitoring

## 2024-11-26 NOTE — ASSESSMENT & PLAN NOTE
S/p bilateral orchiectomy  -recent right radical orchiectomy via inguinal approach by urology Dr. Benoit 11/22/202  Outpatient follow-up

## 2024-11-26 NOTE — H&P
Hospital Medicine History & Physical Note    Date of Service  11/25/2024    Primary Care Physician  Carlos Wright M.D.    Consultants  Vascular surgery (Dr. Townsend)    Code Status  Full Code    Chief Complaint  Chief Complaint   Patient presents with    Sent by MD     Abnormal CT chest abd pelvis, yesterday       History of Presenting Illness  Charanjit Morin Jr. is a 48 y.o. male with history of tobacco abuse, diabetes, neuropathy, testicular cancer s/p surgery now in remission and on hormonal therapy who presented 11/25/2024 with evaluation for abnormal CTAP reading.  Patient has been treated with previously for left testicular cancer status post Req ectomy and radiation therapy.  He recently underwent right radical orchiectomy via inguinal approach by urology Dr. Benoit 11/22/2024.  CT CAP 11/23/2024 ordered by outpatient provider as cancer staging/screening noted to have multiple bilateral renal infarcts, as well as complete occlusion of left common iliac artery, no obvious metastasis.  Patient was instructed to go to ER for evaluation.    Doppler study of LLE also suggested of PVD with atherosclerotic changes, concern for arterial occlusion.  Vascular surgery was consulted, recommended heparin drip for now, formal evaluation to follow.  Admission requested by ERP.  Therefore, admitted to medicine service for further evaluation and treatment.    I discussed the plan of care with patient, bedside RN, charge RN, and pharmacy.    Review of Systems  Review of Systems   Constitutional:  Negative for chills and fever.   HENT:  Negative for hearing loss and tinnitus.    Eyes:  Negative for blurred vision and double vision.   Respiratory:  Negative for cough and shortness of breath.    Cardiovascular:  Negative for chest pain and palpitations.   Gastrointestinal:  Negative for abdominal pain, heartburn, nausea and vomiting.   Genitourinary:  Negative for dysuria and hematuria.   Musculoskeletal:  Positive for  myalgias. Negative for joint pain.   Skin:  Negative for itching and rash.   Neurological:  Positive for weakness. Negative for dizziness, focal weakness and headaches.   Endo/Heme/Allergies:  Negative for environmental allergies. Does not bruise/bleed easily.   Psychiatric/Behavioral:  Negative for depression and substance abuse.    All other systems reviewed and are negative.      Past Medical History   has a past medical history of Cancer ([facility]) (2000), Cancer ([facility]) (2020), Heart burn, radiation therapy, Renal disorder, and Snoring.    Surgical History   has a past surgical history that includes orchiectomy (Left) and orchiectomy (Right, 11/22/2020).     Family History  family history includes Cancer in his maternal grandfather and mother.   Family history reviewed with patient. There is family history that is pertinent to the chief complaint.     Social History   reports that he has been smoking cigarettes. He has a 10 pack-year smoking history. He has never used smokeless tobacco. He reports current alcohol use. He reports current drug use. Drugs: Inhaled and Marijuana.    Allergies  No Known Allergies    Medications  Prior to Admission Medications   Prescriptions Last Dose Informant Patient Reported? Taking?   dapagliflozin propanediol (FARXIGA) 10 MG Tab 11/25/2024 at  9:00 AM Patient's Home Pharmacy, Patient Yes Yes   Sig: Take 10 mg by mouth every day.   gabapentin (NEURONTIN) 300 MG Cap 11/25/2024 at  9:00 AM Patient's Home Pharmacy, Patient Yes Yes   Sig: Take 300 mg by mouth 2 times a day. May take extra at night PRN   ibuprofen (MOTRIN) 200 MG Tab 11/25/2024 at  9:00 AM Patient Yes Yes   Sig: Take 400-600 mg by mouth 1 time a day as needed for Mild Pain.   metFORMIN (GLUCOPHAGE) 500 MG Tab 11/25/2024 at  9:00 AM Patient's Home Pharmacy, Patient Yes Yes   Sig: Take 500 mg by mouth 2 times a day.   testosterone cypionate (DEPO-TESTOSTERONE) 200 MG/ML injection 11/22/2024 Morning Patient Yes Yes   Sig:  Inject 200 mg into the shoulder, thigh, or buttocks every 5 days.      Facility-Administered Medications: None       Physical Exam  Temp:  [36.5 °C (97.7 °F)-36.6 °C (97.9 °F)] 36.6 °C (97.9 °F)  Pulse:  [] 73  Resp:  [14-18] 16  BP: (114-154)/() 116/80  SpO2:  [92 %-96 %] 95 %  Blood Pressure: 119/81   Temperature: 36.6 °C (97.8 °F)   Pulse: 83   Respiration: 14   Pulse Oximetry: 95 %       Physical Exam  Vitals and nursing note reviewed.   Constitutional:       General: He is not in acute distress.  HENT:      Head: Normocephalic and atraumatic.      Nose: Nose normal.      Mouth/Throat:      Mouth: Mucous membranes are moist.      Pharynx: Oropharynx is clear.   Eyes:      General: No scleral icterus.     Extraocular Movements: Extraocular movements intact.   Cardiovascular:      Rate and Rhythm: Normal rate and regular rhythm.      Pulses: Normal pulses.      Heart sounds:      No friction rub.   Pulmonary:      Effort: No respiratory distress.      Breath sounds: Rales present. No wheezing.   Chest:      Chest wall: No tenderness.   Abdominal:      General: There is no distension.      Tenderness: There is no abdominal tenderness. There is no guarding or rebound.   Genitourinary:     Penis: Normal.       Testes: Normal.   Musculoskeletal:         General: Tenderness present. No signs of injury. Normal range of motion.      Cervical back: Neck supple. No tenderness.      Comments: LLE-appreciable pulse distally all the way to foot, appreciable warmth, minimally tender   Skin:     General: Skin is warm and dry.      Capillary Refill: Capillary refill takes less than 2 seconds.   Neurological:      General: No focal deficit present.      Mental Status: He is alert and oriented to person, place, and time.   Psychiatric:         Mood and Affect: Mood normal.         Laboratory:  Recent Labs     11/25/24  1603   WBC 10.7   RBC 4.62*   HEMOGLOBIN 14.4   HEMATOCRIT 43.0   MCV 93.1   MCH 31.2   MCHC 33.5  "  RDW 45.6   PLATELETCT 383   MPV 10.3     Recent Labs     11/25/24  1603   SODIUM 137   POTASSIUM 3.8   CHLORIDE 103   CO2 20   GLUCOSE 117*   BUN 9   CREATININE 0.73   CALCIUM 9.4     Recent Labs     11/25/24  1603   ALTSGPT 13   ASTSGOT 25   ALKPHOSPHAT 163*   TBILIRUBIN 0.3   GLUCOSE 117*     Recent Labs     11/25/24  1603 11/25/24  1831   APTT 26.1 24.8   INR 0.95 0.95     No results for input(s): \"NTPROBNP\" in the last 72 hours.      Recent Labs     11/25/24  1603   TROPONINT <6       Imaging:  US-JORGITO SINGLE LEVEL BILAT   Final Result      US-EXTREMITY ARTERY LOWER UNILAT LEFT   Final Result          EKG:  I have personally reviewed the images and compared with prior images.    Assessment/Plan:  Justification for Admission Status  I anticipate this patient will require at least 2 midnights of hospitalization, therefore appropriate for inpatient status.        * Iliac artery occlusion, left (HCC)- (present on admission)  Assessment & Plan  CT chest abdomen pelvis 11/23/2024: Complete occlusion of left common iliac artery  Ultrasound arterial duplex of LLE 11/25/2024: Atherosclerotic changes, concern for arterial occlusion    Heparin drip for now  -Titrate Xa level to achieve therapeutic goal  -Monitor for bleeding risk  ASA/statin for now  Supportive pain control  Smoking cessation strongly advised    Vascular surgery consulted, follow-up appreciated  Admit to telemetry unit for close hemodynamic monitoring    PVD (peripheral vascular disease) with claudication ([facility])  Assessment & Plan  Plan as noted above  Smoking cessation advised  ASA/statin for now  Continue gabapentin  Heparin drip as above    Neuropathy  Assessment & Plan  Gabapentin    DM (diabetes mellitus) (HCC)  Assessment & Plan  ISS while inpatient  Statin    Tobacco abuse counseling  Assessment & Plan  He reported smoking more than 1 pack of cigarettes daily before, currently admits to smoking at least half a pack of cigarettes daily  Discussed the " need for tobacco smoking cessation given concern for history of malignancy, as well as PVD with active tobacco smoking, hormonal product use active tobacco--placing patient at risk for VTE    Malignant tumor of testis (HCC)- (present on admission)  Assessment & Plan  S/p bilateral orchiectomy  -recent right radical orchiectomy via inguinal approach by urology Dr. Benoit 11/22/202  Outpatient follow-up        VTE prophylaxis: therapeutic anticoagulation with heparin drip    Critical care time: 40 minutes spent in chart review, medical management, cording care patient/ER staff/RN/pharmacy/consulting provider/frequent reevaluation of patient clinical response to treatment

## 2024-11-26 NOTE — ED NOTES
Bedside report received from off going RN/tech: irma, assumed care of patient.  POC discussed with patient. Call light within reach, all needs addressed at this time.       Fall risk interventions in place: Not Applicable (all applicable per [place] Fall risk assessment)   Continuous monitoring: Cardiac Leads, Pulse Ox, or Blood Pressure  IVF/IV medications: Infusion per MAR (List Med(s)) heparin 18units/kg/hr  Oxygen: Room Air  Bedside sitter: Not Applicable   Isolation: Not Applicable

## 2024-11-26 NOTE — CONSULTS
VASCULAR SURGERY CONSULTATION      DATE OF CONSULTATION: 11/26/2024     REFERRING PHYSICIAN: Gerhard Gomez MD     CONSULTING PHYSICIAN: Hang Townsend M.D.    REASON FOR CONSULTATION: Evaluate patient with abnormal CT scan suggesting thromboembolic disease.     HISTORY OF PRESENT ILLNESS: The patient is a 48 y.o. gentleman who has a history of testicular cancer.  The patient reports that he was treated several years ago had some recurrence and just finished treatment about 3 or 4 years ago.  He has been in surveillance through annual CT scans with no evidence of recurrent disease.  On the patient's most recent annual CT scan which was scheduled electively it was noted that there was some old appearing infarcts and the kidneys.  There was also a new right common iliac artery occlusion.  Noninvasive arterial studies demonstrate an ankle-brachial index of 0.7 on the right and 1.0 on the left.  The patient is asymptomatic with respect to any symptoms of claudication or arterial insufficiency.  This was an incidental finding.  Based on the results of that scheduled annual CT scan the patient was sent to the hospital and admitted for a workup.  The patient has been placed on a heparin drip.  He has no previous history of atrial fibrillation and has no cardiac issues.  He said no previous episodes of thromboembolic disease throughout his malignancy and treatments..     PAST MEDICAL HISTORY:  has a past medical history of Cancer (HCC) (2000), Cancer (HCC) (2020), Heart burn, radiation therapy, Renal disorder, and Snoring.     PAST SURGICAL HISTORY:  has a past surgical history that includes orchiectomy (Left) and orchiectomy (Right, 11/22/2020).     ALLERGIES: No Known Allergies     CURRENT MEDICATIONS:   Home Medications       Reviewed by Vicky Ashraf (Pharmacy Tech) on 11/25/24 at 1639  Med List Status: Complete     Medication Last Dose Status   dapagliflozin propanediol (FARXIGA) 10 MG Tab 11/25/2024 Active    gabapentin (NEURONTIN) 300 MG Cap 11/25/2024 Active   ibuprofen (MOTRIN) 200 MG Tab 11/25/2024 Active   metFORMIN (GLUCOPHAGE) 500 MG Tab 11/25/2024 Active   testosterone cypionate (DEPO-TESTOSTERONE) 200 MG/ML injection 11/22/2024 Active                  Audit from Redirected Encounters    **Home medications have not yet been reviewed for this encounter**         FAMILY HISTORY:   Family History   Problem Relation Age of Onset    Cancer Mother         ovarian cancer    Cancer Maternal Grandfather         prostate cancer       History reviewed. No pertinent family history.  No complaints    SOCIAL HISTORY:   Social History     Tobacco Use    Smoking status: Every Day     Current packs/day: 0.50     Average packs/day: 0.5 packs/day for 20.0 years (10.0 ttl pk-yrs)     Types: Cigarettes    Smokeless tobacco: Never    Tobacco comments:     not recent    Vaping Use    Vaping status: Never Used   Substance and Sexual Activity    Alcohol use: Yes     Comment: 4 drinks per day     Drug use: Yes     Types: Inhaled, Marijuana     Comment: marijuana daily     Sexual activity: Not on file       Patient reports no complaints  Denies chest pain   Denies Shortness of breath   Denies focal neuro deficits suggestive of stroke   Denies nausea and vomiting       All other systems were reviewed and are negative (AMA/CMS criteria)                General - Awake Alert  Head - normocephalic   Lungs - Clear   Heart _ RRR  Abdomen - Soft, Nontender   Neuro - Grossly intact   Extremities - Warm and well perfused at rest       LABORATORY VALUES:   Recent Labs     11/25/24  1603 11/26/24  0805   WBC 10.7 11.2*   RBC 4.62* 4.45*   HEMOGLOBIN 14.4 14.4   HEMATOCRIT 43.0 42.1   MCV 93.1 94.6   MCH 31.2 32.4   MCHC 33.5 34.2   RDW 45.6 46.9   PLATELETCT 383 350   MPV 10.3 10.6     Recent Labs     11/25/24  1603 11/26/24  0805   SODIUM 137 135   POTASSIUM 3.8 4.1   CHLORIDE 103 101   CO2 20 21   GLUCOSE 117* 139*   BUN 9 14   CREATININE 0.73  0.56   CALCIUM 9.4 9.1     Recent Labs     11/25/24  1603 11/25/24  1831   ASTSGOT 25  --    ALTSGPT 13  --    TBILIRUBIN 0.3  --    ALKPHOSPHAT 163*  --    GLOBULIN 3.5  --    INR 0.95 0.95     Recent Labs     11/25/24  1603 11/25/24  1831   APTT 26.1 24.8   INR 0.95 0.95        IMAGING:   US-JORGITO SINGLE LEVEL BILAT   Final Result      US-EXTREMITY ARTERY LOWER UNILAT LEFT   Final Result      CT-CTA COMPLETE THORACOABDOMINAL AORTA    (Results Pending)   EC-ECHOCARDIOGRAM COMPLETE W/O CONT    (Results Pending)       IMPRESSION AND PLAN:     Active Hospital Problems    Diagnosis     Iliac artery occlusion, left (HCC) [I74.5]     Tobacco abuse counseling [Z71.6]     PVD (peripheral vascular disease) with claudication (HCC) [I73.9]     DM (diabetes mellitus) (HCC) [E11.9]     Neuropathy [G62.9]     Malignant tumor of testis (HCC) [C62.90]      S/p R radical orch on 11/22/2020   S/p L radical orch on 2010 at [facility] with 5 radiation treatments?       Suspected thromboembolic disease detected incidentally on annual surveillance CT scan for testicular cancer.  The patient denies symptoms of claudication or any symptoms suggestive of arterial insufficiency in the right lower extremity.  Based on the results of the CTA I have recommended systemic anticoagulation.  Patient may be hypercoagulable from his malignancy or history of a malignancy.  Recommend obtaining echocardiogram to rule out cardiac issue as a source.    With respect to his right common iliac artery occlusion this is asymptomatic and unknown how long this has been present.  Will follow-up with the patient in the office to follow this as long as it is asymptomatic will not intervene.    Discussed with primary service    Follow-up my office 2 weeks  ____________________________________   Hang Townsend M.D.          DD: 11/26/2024   DT: 3:16 PM

## 2024-11-26 NOTE — ASSESSMENT & PLAN NOTE
He reported smoking more than 1 pack of cigarettes daily before, currently admits to smoking at least half a pack of cigarettes daily  Discussed the need for tobacco smoking cessation given concern for history of malignancy, as well as PVD with active tobacco smoking, hormonal product use active tobacco--placing patient at risk for VTE

## 2024-11-26 NOTE — ASSESSMENT & PLAN NOTE
Plan as noted above  Smoking cessation advised  ASA/statin for now  Continue gabapentin  Apixaban instead of heparin

## 2024-11-26 NOTE — CARE PLAN
The patient is Stable - Low risk of patient condition declining or worsening    Shift Goals  Clinical Goals: therapeutic Anti-Xa levels; monitor V/S  Patient Goals: rest, updates  Family Goals: BOB    Progress made toward(s) clinical / shift goals:    Problem: Knowledge Deficit - Standard  Goal: Patient and family/care givers will demonstrate understanding of plan of care, disease process/condition, diagnostic tests and medications  Description: Target End Date:  1-3 days or as soon as patient condition allows    Document in Patient Education    1.  Patient and family/caregiver oriented to unit, equipment, visitation policy and means for communicating concern  2.  Complete/review Learning Assessment  3.  Assess knowledge level of disease process/condition, treatment plan, diagnostic tests and medications  4.  Explain disease process/condition, treatment plan, diagnostic tests and medications  11/26/2024 0112 by Makenzie Dean R.N.  Outcome: Progressing  11/26/2024 0109 by JEREMÍAS RomeroN.  Outcome: Progressing  11/26/2024 0108 by JEREMÍAS RomeroN.  Outcome: Progressing     Problem: Hemodynamics  Goal: Patient's hemodynamics, fluid balance and neurologic status will be stable or improve  Description: Target End Date:  Prior to discharge or change in level of care    Document on Assessment and I/O flowsheet templates    1.  Monitor vital signs, pulse oximetry and cardiac monitor per provider order and/or policy  2.  Maintain blood pressure per provider order  3.  Hemodynamic monitoring per provider order  4.  Manage IV fluids and IV infusions  5.  Monitor intake and output  6.  Daily weights per unit policy or provider order  7.  Assess peripheral pulses and capillary refill  8.  Assess color and body temperature  9.  Position patient for maximum circulation/cardiac output  10. Monitor for signs/symptoms of excessive bleeding  11. Assess mental status, restlessness and changes in level of consciousness  12. Monitor  temperature and report fever or hypothermia to provider immediately. Consideration of targeted temperature management.  11/26/2024 0112 by Makenzie Dean R.N.  Outcome: Progressing  11/26/2024 0109 by Makenzie Dean R.N.  Outcome: Progressing  11/26/2024 0108 by Makenzie Dean R.N.  Outcome: Progressing     Problem: Fluid Volume  Goal: Fluid volume balance will be maintained  Description: Target End Date:  Prior to discharge or change in level of care    Document on I/O flowsheet    1.  Monitor intake and output as ordered  2.  Promote oral intake as appropriate  3.  Report inadequate intake or output to physician  4.  Administer IV therapy as ordered  5.  Weights per provider order  6.  Assess for signs and symptoms of bleeding  7.  Monitor for signs of fluid overload (respiratory changes, edema, weight gain, increased abdominal girth)  8.  Monitor of signs for inadequate fluid volume (poor skin turgor, dry mucous membranes)  9.  Instruct patient on adherence to fluid restrictions  11/26/2024 0112 by Makenzie Dean R.N.  Outcome: Progressing  11/26/2024 0109 by Makenzie Dean R.N.  Outcome: Progressing  11/26/2024 0108 by Makenzie Dean R.N.  Outcome: Progressing     Problem: Urinary - Renal Perfusion  Goal: Ability to achieve and maintain adequate renal perfusion and functioning will improve  Description: Target End Date:  Prior to discharge or change in level of care    Document on I/O and Assessment flowsheet    1.  Urine output will remain greater than 0.5ml/Kg/HR  2.  Monitor amount and/or characteristics of urine per order/policy. Specific gravity per order/policy  3.  Assess signs and symptoms of renal dysfunction  11/26/2024 0112 by Makenzie Dean R.N.  Outcome: Progressing  11/26/2024 0109 by Makenzie Dean R.N.  Outcome: Progressing  11/26/2024 0108 by Makenzie Dean R.N.  Outcome: Progressing     Problem: Respiratory  Goal: Patient will achieve/maintain optimum respiratory ventilation and gas exchange  Description: Target  End Date:  Prior to discharge or change in level of care    Document on Assessment flowsheet    1.  Assess and monitor rate, rhythm, depth and effort of respiration  2.  Breath sounds assessed qshift and/or as needed  3.  Assess O2 saturation, administer/titrate oxygen as ordered  4.  Position patient for maximum ventilatory efficiency  5.  Turn, cough, and deep breath with splinting to improve effectiveness  6.  Collaborate with RT to administer medication/treatments per order  7.  Encourage use of incentive spirometer and encourage patient to cough after use and utilize splinting techniques if applicable  8.  Airway suctioning  9.  Monitor sputum production for changes in color, consistency and frequency  10. Perform frequent oral hygiene  11. Alternate physical activity with rest periods  11/26/2024 0112 by Makenzie Dean R.N.  Outcome: Progressing  11/26/2024 0109 by Makenzie Dean R.N.  Outcome: Progressing  11/26/2024 0108 by Makenzie Dean R.N.  Outcome: Progressing     Problem: Mechanical Ventilation  Goal: Safe management of artificial airway and ventilation  Description: Target End Date:  when vent discontinued    Document on VAP flowsheet and Airway LDA    1.  Daily awakening trials per provider order/policy  2.  Suctioning and care of ET/Trach tube (document on LDA)  3.  Collaborate with RT to administer medications/treatments  4.  Ambu bag at bedside and available for transport  5.  Trach patient - replacement trach at bedside  6.  Provide communication tools if applicable  11/26/2024 0112 by ADRIEN Romero.ASTRID.  Outcome: Progressing  11/26/2024 0109 by ADRIEN Romero.N.  Outcome: Progressing  11/26/2024 0108 by ADRIEN Romero.N.  Outcome: Progressing  Goal: Successful weaning off mechanical ventilator, spontaneously maintains adequate gas exchange  Description: Target End Date:  when vent discontinued    1.  Follow universal weaning protocol for patients on mechanical ventilation per order  2.  Review  contraindication list.  Obtain provider order to wean in presence of contraindication.  3.  Obtain Natacha Sedation-Agitation Score  4.  Natacha Score 1-2:  sedation vacation  5.  Natacha Score 3-4:  Collaborate with provider and/or RT to determine readiness for trial  6.  Begin 2 hour trial of weaning following protocol  7.  Evaluate for fatigue parameters per protocol  8.  Fatigue parameters triggered:  Stop wean and return to previous ASV% setting or increase % minute volume to offset work or breathing  11/26/2024 0112 by Makenzie Dean R.N.  Outcome: Progressing  11/26/2024 0109 by Makenzie Dean R.N.  Outcome: Progressing  11/26/2024 0108 by Makenzie Dean R.N.  Outcome: Progressing  Goal: Patient will be able to express needs and understand communication  Description: Target End Date:  when vent discontinued    1.  Assess ability to communicate and understand  2.  Provide communication tools  3.  Collaborate with Speech Therapy for [facility]  11/26/2024 0112 by Makenzie Dean R.N.  Outcome: Progressing  11/26/2024 0109 by Makenzie Dean R.N.  Outcome: Progressing  11/26/2024 0108 by Makenzie Dean R.N.  Outcome: Progressing     Problem: Physical Regulation  Goal: Diagnostic test results will improve  Description: Target End Date:  Prior to discharge or change in level of care    1.  Monitor lactic acid levels  2.  Monitor ABG's  3.  Monitor diagnostic test results  11/26/2024 0112 by Makenzie Dean R.N.  Outcome: Progressing  11/26/2024 0109 by Makenzie Dean R.N.  Outcome: Progressing  11/26/2024 0108 by Makenzie Dean R.N.  Outcome: Progressing  Goal: Signs and symptoms of infection will decrease  Description: Target End Date:  Prior to discharge or change in level of care    1.  Remove potential routes of infection, such as central lines and urinary catheter  2.  Follow facility protocol for changing IV tubing and sites  3.  Collaborate with Infectious Disease  4.  Antibiotic therapy per provider order  5.  Note drug effects and  monitor for antibiotic toxicity  11/26/2024 0112 by Makenzie Dean R.N.  Outcome: Progressing  11/26/2024 0109 by Makenzie Dean R.N.  Outcome: Progressing  11/26/2024 0108 by Makenzie Dean, R.N.  Outcome: Progressing       Patient is not progressing towards the following goals:

## 2024-11-27 VITALS
HEIGHT: 66 IN | SYSTOLIC BLOOD PRESSURE: 117 MMHG | TEMPERATURE: 97.3 F | HEART RATE: 77 BPM | WEIGHT: 126.54 LBS | RESPIRATION RATE: 16 BRPM | OXYGEN SATURATION: 97 % | BODY MASS INDEX: 20.34 KG/M2 | DIASTOLIC BLOOD PRESSURE: 86 MMHG

## 2024-11-27 LAB — GLUCOSE BLD STRIP.AUTO-MCNC: 148 MG/DL (ref 65–99)

## 2024-11-27 PROCEDURE — 700102 HCHG RX REV CODE 250 W/ 637 OVERRIDE(OP): Performed by: STUDENT IN AN ORGANIZED HEALTH CARE EDUCATION/TRAINING PROGRAM

## 2024-11-27 PROCEDURE — 700102 HCHG RX REV CODE 250 W/ 637 OVERRIDE(OP)

## 2024-11-27 PROCEDURE — A9270 NON-COVERED ITEM OR SERVICE: HCPCS | Performed by: STUDENT IN AN ORGANIZED HEALTH CARE EDUCATION/TRAINING PROGRAM

## 2024-11-27 PROCEDURE — A9270 NON-COVERED ITEM OR SERVICE: HCPCS

## 2024-11-27 PROCEDURE — 99239 HOSP IP/OBS DSCHRG MGMT >30: CPT | Performed by: STUDENT IN AN ORGANIZED HEALTH CARE EDUCATION/TRAINING PROGRAM

## 2024-11-27 PROCEDURE — 82962 GLUCOSE BLOOD TEST: CPT

## 2024-11-27 RX ORDER — ASPIRIN 81 MG/1
81 TABLET ORAL DAILY
Qty: 30 TABLET | Refills: 0 | Status: SHIPPED | OUTPATIENT
Start: 2024-11-28 | End: 2024-12-28

## 2024-11-27 RX ORDER — ATORVASTATIN CALCIUM 40 MG/1
40 TABLET, FILM COATED ORAL EVERY EVENING
Qty: 30 TABLET | Refills: 0 | Status: SHIPPED | OUTPATIENT
Start: 2024-11-27 | End: 2024-12-27

## 2024-11-27 RX ADMIN — APIXABAN 5 MG: 5 TABLET, FILM COATED ORAL at 04:12

## 2024-11-27 RX ADMIN — NICOTINE 14 MG: 14 PATCH TRANSDERMAL at 04:13

## 2024-11-27 RX ADMIN — GABAPENTIN 300 MG: 300 CAPSULE ORAL at 04:12

## 2024-11-27 RX ADMIN — ASPIRIN 81 MG: 81 TABLET, COATED ORAL at 04:12

## 2024-11-27 ASSESSMENT — FIBROSIS 4 INDEX: FIB4 SCORE: 0.95

## 2024-11-27 ASSESSMENT — PAIN DESCRIPTION - PAIN TYPE: TYPE: ACUTE PAIN

## 2024-11-27 NOTE — DISCHARGE INSTRUCTIONS
Please be sure to follow up Vascular Surgery outpatient  YOU HAVE A SCHEDULED APPOINTMENT WITH DR. RYAN NARVAEZ ON 11/29/2024    You were started on a new medication called Apixaban (Eliquis) for a clot seen in your right iliac artery. This is to help prevent more clots from forming. You can be more prone to bleeding, and bleed for longer.     Please be sure to follow up with your primary care  provider if you develop blood in your urine, stool, vomiting blood, or nosebleed that does not stop.    Discharge Instructions    Discharged to home by car with relative. Discharged via wheelchair, hospital escort: Yes.  Special equipment needed: Not Applicable    Be sure to schedule a follow-up appointment with your primary care doctor or any specialists as instructed.     Discharge Plan:   Diet Plan: Discussed  Activity Level: Discussed  Confirmed Follow up Appointment: Patient to Call and Schedule Appointment  Confirmed Symptoms Management: Discussed  Medication Reconciliation Updated: Yes  Influenza Vaccine Indication: Patient Refuses    I understand that a diet low in cholesterol, fat, and sodium is recommended for good health. Unless I have been given specific instructions below for another diet, I accept this instruction as my diet prescription.   Other diet: As tolerated    Special Instructions: None    -Is this patient being discharged with medication to prevent blood clots?  No    Is patient discharged on Warfarin / Coumadin?   No

## 2024-11-27 NOTE — HOSPITAL COURSE
Charanjit Magdalenolorena Lala. is a 48 y.o. 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 Past medical history of tobacco abuse, diabetes, neuropathy, and testicular cancer s/p bilateral orchidectomy.  Admitted 11/25/2024 for further work up of Arterial thrombus of left common iliac artery.     Doppler study of LLE also suggested of PVD with atherosclerotic changes, but no concerns for arterial occlusion. Heparin Drip started. JORGITO of both lower limbs were not significant for flow limiting stenosis     Vascular surgery was consulted, who assessed that this was likely a chronic thrombus. Recommended medical management with apixaban 5mg bid (heparin drip will be stopped). Patent was deemed medically stable for discharge with outpatient follow up with vascular surgery. Can obtain and ECHO outpatient for check for cardiac thrombus in the setting of arterial clot and old/new renal infarcts.

## 2024-11-27 NOTE — PROGRESS NOTES
Pt discharged home. IV site's removed from bilateral arms, cath tips intact, no complications noted. Discharge education and instructions provided to pt, with pt verbalizing understanding. Pt advised to  prescriptions from [facility] in [place], [place], with pt verbalizing understanding. Personal belongings gathered and taken home with pt. Pt ambulated independently out to sig others vehicle w/o complication.

## 2024-11-27 NOTE — DISCHARGE SUMMARY
[facility] Internal Medicine Discharge Summary    Attending: Tequila Cordero M.d.  Senior Resident: Dr. Gomez  Intern:  Dr. Garcia  Contact Number: [phone]    CHIEF COMPLAINT ON ADMISSION  Chief Complaint   Patient presents with    Sent by MD     Abnormal CT chest abd pelvis, yesterday       Reason for Admission  Sent By MD     Admission Date  11/25/2024    CODE STATUS  Full Code    HPI & HOSPITAL COURSE   Charanjit Morin Jr. is a 48 y.o. 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 Past medical history of tobacco abuse, diabetes, neuropathy, and testicular cancer s/p bilateral orchidectomy.  Admitted 11/25/2024 for further work up of Arterial thrombus of left common iliac artery.     Doppler study of LLE also suggested of PVD with atherosclerotic changes, but no concerns for arterial occlusion. Heparin Drip started. JORGITO of both lower limbs were not significant for flow limiting stenosis     Vascular surgery was consulted, who assessed that this was likely a chronic thrombus. Recommended medical management with apixaban 5mg bid (heparin drip will be stopped). Patent was deemed medically stable for discharge with outpatient follow up with vascular surgery. Can obtain and ECHO outpatient for check for cardiac thrombus in the setting of arterial clot and old/new renal infarcts.      Therefore, he is discharged in good and stable condition to home with close outpatient follow-up.    The patient recovered much more quickly than anticipated on admission.    Discharge Date  11/27/2024    Physical Exam on Day of Discharge  Physical Exam  Constitutional:       General: He is not in acute distress.     Appearance: Normal appearance. He is normal weight. He is not ill-appearing or toxic-appearing.   HENT:      Head: Normocephalic and atraumatic.      Nose: Nose normal. No congestion or rhinorrhea.      Mouth/Throat:      Mouth: Mucous membranes are moist.       Pharynx: Oropharynx is clear.   Eyes:      Extraocular Movements: Extraocular movements intact.      Conjunctiva/sclera: Conjunctivae normal.      Pupils: Pupils are equal, round, and reactive to light.   Cardiovascular:      Rate and Rhythm: Normal rate and regular rhythm.      Pulses: Normal pulses.      Heart sounds: Normal heart sounds. No murmur heard.     No gallop.   Pulmonary:      Effort: Pulmonary effort is normal. No respiratory distress.      Breath sounds: Normal breath sounds. No wheezing or rales.   Abdominal:      General: Abdomen is flat. Bowel sounds are normal. There is no distension.      Palpations: Abdomen is soft.      Tenderness: There is no abdominal tenderness. There is no guarding.   Musculoskeletal:         General: Normal range of motion.      Right lower leg: No edema.      Left lower leg: No edema.   Skin:     General: Skin is warm and dry.      Capillary Refill: Capillary refill takes less than 2 seconds.      Findings: No bruising, erythema, lesion or rash.   Neurological:      General: No focal deficit present.      Mental Status: He is alert and oriented to person, place, and time. Mental status is at baseline.   Psychiatric:         Mood and Affect: Mood normal.         Behavior: Behavior normal.         FOLLOW UP ITEMS POST DISCHARGE  Please be sure to follow up Vascular Surgery outpatient  YOU HAVE A SCHEDULED APPOINTMENT WITH DR. RYAN NARVAEZ ON 11/29/2024    You were started on a new medication called Apixaban (Eliquis) for a clot seen in your Left iliac artery. This is to help prevent more clots from forming. You can be more prone to bleeding, and bleed for longer.     Please be sure to follow up with your primary care  provider if you develop blood in your urine, stool, vomiting blood, or nosebleed that does not stop.    DISCHARGE DIAGNOSES  Principal Problem:    Iliac artery occlusion, left (HCC) (POA: Yes)  Active Problems:    Malignant tumor of testis (HCC) (POA: Yes)       Overview: S/p R radical orch on 11/22/2020       S/p L radical orch on 2010 at [facility] with 5 radiation treatments?    Tobacco abuse counseling (POA: Unknown)    PVD (peripheral vascular disease) with claudication (HCC) (POA: Unknown)    DM (diabetes mellitus) (HCC) (POA: Unknown)    Neuropathy (POA: Unknown)  Resolved Problems:    * No resolved hospital problems. *      FOLLOW UP  Hang Townsend M.D.  [address]  [facility] [address]  [phone]    Follow up in 2 day(s)        MEDICATIONS ON DISCHARGE     Medication List        ASK your doctor about these medications        Instructions   dapagliflozin propanediol 10 MG Tabs  Commonly known as: Farxiga   Take 10 mg by mouth every day.  Dose: 10 mg     gabapentin 300 MG Caps  Commonly known as: Neurontin   Take 300 mg by mouth 2 times a day. May take extra at night PRN  Dose: 300 mg     ibuprofen 200 MG Tabs  Commonly known as: Motrin   Take 400-600 mg by mouth 1 time a day as needed for Mild Pain.  Dose: 400-600 mg     metFORMIN 500 MG Tabs  Commonly known as: Glucophage   Take 500 mg by mouth 2 times a day.  Dose: 500 mg     testosterone cypionate 200 MG/ML injection  Commonly known as: Depo-Testosterone   Inject 200 mg into the shoulder, thigh, or buttocks every 5 days.  Dose: 200 mg              Allergies  No Known Allergies    DIET  Orders Placed This Encounter   Procedures    Diet Order Diet: Consistent CHO (Diabetic); Second Modifier: (optional): Cardiac     Standing Status:   Standing     Number of Occurrences:   1     Order Specific Question:   Diet:     Answer:   Consistent CHO (Diabetic) [4]     Order Specific Question:   Second Modifier: (optional)     Answer:   Cardiac [6]       ACTIVITY  As tolerated.  Weight bearing as tolerated    CONSULTATIONS  Vascular Surgery    PROCEDURES  None    LABORATORY  Lab Results   Component Value Date    SODIUM 135 11/26/2024    POTASSIUM 4.1 11/26/2024    CHLORIDE 101 11/26/2024    CO2 21 11/26/2024    GLUCOSE  139 (H) 11/26/2024    BUN 14 11/26/2024    CREATININE 0.56 11/26/2024        Lab Results   Component Value Date    WBC 11.2 (H) 11/26/2024    HEMOGLOBIN 14.4 11/26/2024    HEMATOCRIT 42.1 11/26/2024    PLATELETCT 350 11/26/2024        Total time of the discharge process exceeds 50 minutes.

## 2024-11-27 NOTE — HOSPITAL COURSE
Charanjit Magdalenolorena Lala. is a 48 y.o. 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 Past medical history of tobacco abuse, diabetes, neuropathy, and testicular cancer s/p bilateral orchidectomy.  Admitted 11/25/2024 for further work up of Arterial thrombus of left common iliac artery.     Doppler study of LLE also suggested of PVD with atherosclerotic changes, but no concerns for arterial occlusion. Heparin Drip started. JORGITO of both lower limbs were not significant for flow limiting stenosis    Vascular surgery was consulted, who assessed that this was likely a chronic thrombus. Recommended medical management with apixaban 5mg bid (heparin drip will be stopped).    ECHO pending to rule out cardiac thrombus, as there are signs of bilateral old and new renal infarcts.

## 2024-11-27 NOTE — CARE PLAN
The patient is Stable - Low risk of patient condition declining or worsening    Shift Goals  Clinical Goals: monitor fbgs, monitor labs  Patient Goals: rest  Family Goals: dorothy    Progress made toward(s) clinical / shift goals:        Problem: Knowledge Deficit - Standard  Goal: Patient and family/care givers will demonstrate understanding of plan of care, disease process/condition, diagnostic tests and medications  Description: Target End Date:  1-3 days or as soon as patient condition allows    Document in Patient Education    1.  Patient and family/caregiver oriented to unit, equipment, visitation policy and means for communicating concern  2.  Complete/review Learning Assessment  3.  Assess knowledge level of disease process/condition, treatment plan, diagnostic tests and medications  4.  Explain disease process/condition, treatment plan, diagnostic tests and medications  Outcome: Progressing  Note: Monitored labs, tele, and VS.     Problem: Diabetes Management  Goal: Patient will achieve and maintain glucose in satisfactory range  Description: Target End Date:  Prior to discharge or change in level of care    1.  Monitor glucose levels per provider order  2.  Assess for signs and symptoms of hyperglycemia (abdominal pain, bloating, nausea or vomiting)  3.  Assess for signs and symptoms of hypoglycemia (anxiety, tremors, slurred speech, change in LOC, tachycardia, fatigue)  4.  Monitor for early signs and symptoms of infection  5.  Monitor daily weights  6.  Consult to diabetes educator  Outcome: Progressing  Note: Monitored FBGS as ordered.        Patient is not progressing towards the following goals:

## 2024-11-27 NOTE — PROGRESS NOTES
Telemetry Report    Rhythm SR  Rate 74-82  Ectopy N/A     MD 0.14  QRS 0.09  QT 0.41                          Per telemetry room monitor

## 2024-11-27 NOTE — PROGRESS NOTES
[facility] [place] Internal Medicine Daily Progress Note    Date of Service  11/26/2024    [facility] Team: [facility] Team   Attending: Tequila Cordero M.d.  Senior Resident: Gerhard Hayward  Intern:  Julián Maurice  Contact Number: [phone]    Chief Complaint  Charanjit Morin Jr. is a 48 y.o. male admitted 11/25/2024 with CT CAP findings of left common iliac occlusion.    Hospital Course  Charanjit Morin Jr. is a 48 y.o. 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 Past medical history of tobacco abuse, diabetes, neuropathy, and testicular cancer s/p bilateral orchidectomy.  Admitted 11/25/2024 for further work up of Arterial thrombus of left common iliac artery.     Doppler study of LLE also suggested of PVD with atherosclerotic changes, but no concerns for arterial occlusion. Heparin Drip started. JORGITO of both lower limbs were not significant for flow limiting stenosis    Vascular surgery was consulted, who assessed that this was likely a chronic thrombus. Recommended medical management with apixaban 5mg bid (heparin drip will be stopped).    ECHO pending to rule out cardiac thrombus, as there are signs of bilateral old and new renal infarcts.      Interval Problem Update  No over night events.    Patient is completely asymptomatic, denies pain or claudication to his lower limbs. Does state family history of mother having blood clots and taking apixaban.    Discussed with vascular surgery and following their recommendations.    I have discussed this patient's plan of care and discharge plan at IDT rounds today with Case Management, Nursing, Nursing leadership, and other members of the IDT team.    Consultants/Specialty  vascular surgery    Code Status  Full Code    Disposition  The patient is not medically cleared for discharge to home or a post-acute facility.      I have placed the appropriate orders for post-discharge needs.    Review of Systems  Review  of Systems   Constitutional:  Negative for chills, fever, malaise/fatigue and weight loss.   Eyes:  Negative for blurred vision.   Respiratory:  Negative for cough and sputum production.    Cardiovascular:  Negative for chest pain and palpitations.   Gastrointestinal:  Negative for diarrhea, heartburn, nausea and vomiting.   Genitourinary:  Negative for dysuria.   Musculoskeletal:  Negative for myalgias.   Neurological:  Negative for dizziness and headaches.   Psychiatric/Behavioral:  Negative for depression.         Physical Exam  Temp:  [36.4 °C (97.5 °F)-36.6 °C (97.9 °F)] 36.4 °C (97.5 °F)  Pulse:  [70-98] 88  Resp:  [14-18] 17  BP: (107-147)/() 121/77  SpO2:  [92 %-96 %] 95 %    Physical Exam  Constitutional:       General: He is not in acute distress.     Appearance: He is not ill-appearing.   HENT:      Mouth/Throat:      Mouth: Mucous membranes are moist.   Eyes:      Extraocular Movements: Extraocular movements intact.      Pupils: Pupils are equal, round, and reactive to light.   Cardiovascular:      Rate and Rhythm: Normal rate and regular rhythm.      Pulses: Normal pulses.      Heart sounds: No murmur heard.  Pulmonary:      Effort: Pulmonary effort is normal. No respiratory distress.      Breath sounds: No wheezing or rales.   Abdominal:      General: There is no distension.      Palpations: Abdomen is soft.      Tenderness: There is no abdominal tenderness. There is no guarding.   Musculoskeletal:         General: No swelling. Normal range of motion.   Skin:     General: Skin is warm.      Capillary Refill: Capillary refill takes less than 2 seconds.   Neurological:      General: No focal deficit present.      Mental Status: He is alert.      Motor: No weakness.      Gait: Gait normal.   Psychiatric:         Mood and Affect: Mood normal.         Fluids    Intake/Output Summary (Last 24 hours) at 11/26/2024 1634  Last data filed at 11/26/2024 1445  Gross per 24 hour   Intake 1520 ml   Output 0 ml    Net 1520 ml       Laboratory  Recent Labs     11/25/24  1603 11/26/24  0805   WBC 10.7 11.2*   RBC 4.62* 4.45*   HEMOGLOBIN 14.4 14.4   HEMATOCRIT 43.0 42.1   MCV 93.1 94.6   MCH 31.2 32.4   MCHC 33.5 34.2   RDW 45.6 46.9   PLATELETCT 383 350   MPV 10.3 10.6     Recent Labs     11/25/24  1603 11/26/24  0805   SODIUM 137 135   POTASSIUM 3.8 4.1   CHLORIDE 103 101   CO2 20 21   GLUCOSE 117* 139*   BUN 9 14   CREATININE 0.73 0.56   CALCIUM 9.4 9.1     Recent Labs     11/25/24  1603 11/25/24  1831   APTT 26.1 24.8   INR 0.95 0.95               Imaging  US-JORGITO SINGLE LEVEL BILAT   Final Result      US-EXTREMITY ARTERY LOWER UNILAT LEFT   Final Result      EC-ECHOCARDIOGRAM COMPLETE W/O CONT    (Results Pending)        Assessment/Plan  Problem Representation:    * Iliac artery occlusion, left (HCC)- (present on admission)  Assessment & Plan  CT chest abdomen pelvis 11/23/2024: Complete occlusion of left common iliac artery  Ultrasound arterial duplex of LLE 11/25/2024: Atherosclerotic changes, concern for arterial occlusion  JORGITO right 0.7, JORGITO left 1.0    - Vascular consulted, appreciate recs - for medical management given likely chronic asymptomatic thrombus.  - switch from heparin drip to apixaban 5mg bid  -Monitor for bleeding risk  - outpatient work up of thrombophilia  ASA/statin for now  Supportive pain control  Smoking cessation strongly advised    Vascular surgery consulted, follow-up appreciated  Admit to telemetry unit for close hemodynamic monitoring    Neuropathy  Assessment & Plan  Gabapentin    DM (diabetes mellitus) (HCC)  Assessment & Plan  ISS while inpatient  Statin    PVD (peripheral vascular disease) with claudication (HCC)  Assessment & Plan  Plan as noted above  Smoking cessation advised  ASA/statin for now  Continue gabapentin  Apixaban instead of heparin    Tobacco abuse counseling  Assessment & Plan  He reported smoking more than 1 pack of cigarettes daily before, currently admits to smoking at  least half a pack of cigarettes daily  Discussed the need for tobacco smoking cessation given concern for history of malignancy, as well as PVD with active tobacco smoking, hormonal product use active tobacco--placing patient at risk for VTE    Malignant tumor of testis (HCC)- (present on admission)  Assessment & Plan  S/p bilateral orchiectomy  -recent right radical orchiectomy via inguinal approach by urology Dr. Benoit 11/22/202  Outpatient follow-up         VTE prophylaxis: therapeutic anticoagulation with apixaban    I have performed a physical exam and reviewed and updated ROS and Plan today (11/26/2024). In review of yesterday's note (11/25/2024), there are no changes except as documented above.

## 2024-12-04 ENCOUNTER — TELEPHONE (OUTPATIENT)
Dept: ORTHOPEDICS | Facility: MEDICAL CENTER | Age: 49
End: 2024-12-04
Payer: COMMERCIAL

## 2024-12-17 ENCOUNTER — OFFICE VISIT (OUTPATIENT)
Dept: VASCULAR SURGERY | Facility: MEDICAL CENTER | Age: 49
End: 2024-12-17
Payer: COMMERCIAL

## 2024-12-17 VITALS
HEART RATE: 91 BPM | BODY MASS INDEX: 20.89 KG/M2 | WEIGHT: 130 LBS | DIASTOLIC BLOOD PRESSURE: 84 MMHG | SYSTOLIC BLOOD PRESSURE: 136 MMHG | OXYGEN SATURATION: 98 % | HEIGHT: 66 IN | TEMPERATURE: 98.5 F

## 2024-12-17 DIAGNOSIS — I74.5 ILIAC ARTERY OCCLUSION, LEFT (HCC): ICD-10-CM

## 2024-12-17 PROCEDURE — 3079F DIAST BP 80-89 MM HG: CPT | Performed by: SURGERY

## 2024-12-17 PROCEDURE — 3075F SYST BP GE 130 - 139MM HG: CPT | Performed by: SURGERY

## 2024-12-17 PROCEDURE — 99213 OFFICE O/P EST LOW 20 MIN: CPT | Performed by: SURGERY

## 2024-12-17 ASSESSMENT — FIBROSIS 4 INDEX: FIB4 SCORE: 0.97

## 2024-12-17 NOTE — PROGRESS NOTES
"                 VASCULAR SURGERY                 Clinic Progress Note  _________________________________    Vitals for Today's Visit (12/17/2024)  /84 (BP Location: Left arm, Patient Position: Sitting, BP Cuff Size: Adult)   Pulse 91   Temp 36.9 °C (98.5 °F) (Temporal)   Ht 1.676 m (5' 6\")   Wt 59 kg (130 lb)   SpO2 98%   BMI 20.98 kg/m²   _________________________________      12/17/2024  Patient was evaluated previously as an inpatient for an incidental finding of a left common iliac artery occlusion.  The initial consult note indicates that it was a right common iliac artery occlusion however I confirmed based on imaging that it is the left common iliac artery that is occluded.  This is a relatively unexpected finding given the patient's age of only 49 years old.  He does have a longstanding smoking history.  However the appearance on imaging plus some other associated findings such as renal infarcts raise the possibility that this could have been a thromboembolic event, and also possible consideration that the patient could have an underlying hypercoagulable disorder.  At the time of the initial evaluation the patient was asymptomatic with regard to the left iliac occlusion and he remains asymptomatic with good functional status, no claudication, no rest pain, no tissue loss.  His right JORGITO is normal and his left JORGITO is around 0.8.  He is on Eliquis 5 mg twice daily.  At this point I am recommending hypercoagulable testing to see if we can identify any underlying conditions and the patient will follow-up with me after that to discuss results.  If his hypercoagulable testing is negative, I am not entirely sure if we will need to continue his long-term anticoagulation or not although given the fact that there are multiple findings that could be potentially embolic episodes, we should strongly consider leaving him on anticoagulation indefinitely.        José Menjivar MD  [facility] " [facility]  [phone]  [address], [facility] [address]

## 2024-12-31 ENCOUNTER — APPOINTMENT (OUTPATIENT)
Dept: VASCULAR SURGERY | Facility: MEDICAL CENTER | Age: 49
End: 2024-12-31
Payer: COMMERCIAL

## 2025-01-17 ENCOUNTER — HOSPITAL ENCOUNTER (OUTPATIENT)
Dept: LAB | Facility: MEDICAL CENTER | Age: 50
End: 2025-01-17
Attending: SURGERY
Payer: COMMERCIAL

## 2025-01-17 LAB
FIBRINOGEN PPP-MCNC: 273 MG/DL (ref 215–460)
INR PPP: 0.93 (ref 0.87–1.13)
PROTHROMBIN TIME: 12.5 SEC (ref 12–14.6)

## 2025-01-17 PROCEDURE — 81291 MTHFR GENE: CPT

## 2025-01-17 PROCEDURE — 85613 RUSSELL VIPER VENOM DILUTED: CPT

## 2025-01-17 PROCEDURE — 85610 PROTHROMBIN TIME: CPT

## 2025-01-17 PROCEDURE — 83090 ASSAY OF HOMOCYSTEINE: CPT

## 2025-01-17 PROCEDURE — 36415 COLL VENOUS BLD VENIPUNCTURE: CPT

## 2025-01-17 PROCEDURE — 81240 F2 GENE: CPT

## 2025-01-17 PROCEDURE — 85730 THROMBOPLASTIN TIME PARTIAL: CPT

## 2025-01-17 PROCEDURE — 86146 BETA-2 GLYCOPROTEIN ANTIBODY: CPT

## 2025-01-17 PROCEDURE — 85384 FIBRINOGEN ACTIVITY: CPT

## 2025-01-17 PROCEDURE — 86140 C-REACTIVE PROTEIN: CPT

## 2025-01-17 PROCEDURE — 86147 CARDIOLIPIN ANTIBODY EA IG: CPT | Mod: 91

## 2025-01-17 PROCEDURE — 81241 F5 GENE: CPT

## 2025-01-17 PROCEDURE — 85520 HEPARIN ASSAY: CPT

## 2025-01-18 LAB
APTT PPP: 26.5 SEC (ref 24.7–36)
CRP SERPL HS-MCNC: <0.3 MG/DL (ref 0–0.75)
HCYS SERPL-SCNC: 21.02 UMOL/L
INR PPP: 0.91 (ref 0.87–1.13)
LA PPP-IMP: NORMAL
LMWH PPP CHRO-ACNC: <0.1 U/ML
PROTHROMBIN TIME: 12.2 SEC (ref 12–14.6)
SCREEN DRVVT: 37.4 SEC (ref 28–48)

## 2025-01-20 LAB
B2 GLYCOPROT1 IGA SER-ACNC: <10 SAU
B2 GLYCOPROT1 IGG SERPL IA-ACNC: <10 SGU
B2 GLYCOPROT1 IGM SERPL IA-ACNC: <10 SMU

## 2025-01-21 LAB
CARDIOLIPIN IGA SER IA-ACNC: <10 APL
CARDIOLIPIN IGG SER IA-ACNC: <10 GPL
CARDIOLIPIN IGM SER IA-ACNC: <10 MPL

## 2025-01-23 ENCOUNTER — APPOINTMENT (OUTPATIENT)
Dept: VASCULAR SURGERY | Facility: MEDICAL CENTER | Age: 50
End: 2025-01-23
Payer: COMMERCIAL

## 2025-01-23 VITALS
WEIGHT: 133 LBS | HEART RATE: 90 BPM | OXYGEN SATURATION: 97 % | TEMPERATURE: 98.3 F | BODY MASS INDEX: 21.38 KG/M2 | HEIGHT: 66 IN

## 2025-01-23 DIAGNOSIS — I77.9 PAOD (PERIPHERAL ARTERIAL OCCLUSIVE DISEASE) (HCC): ICD-10-CM

## 2025-01-23 DIAGNOSIS — E72.11 HYPERHOMOCYSTEINEMIA (HCC): ICD-10-CM

## 2025-01-23 LAB
F2 C.20210G>A GENO BLD/T: NEGATIVE
F5 P.R506Q BLD/T QL: NEGATIVE
MTHFR C.1298A>C GENO BLD/T: NEGATIVE
MTHFR C.677C>T GENO BLD/T: NORMAL
MTHFR GENE MUT ANL BLD/T: NORMAL

## 2025-01-23 PROCEDURE — 99213 OFFICE O/P EST LOW 20 MIN: CPT | Performed by: SURGERY

## 2025-01-23 RX ORDER — APIXABAN 5 MG/1
5 TABLET, FILM COATED ORAL 2 TIMES DAILY
COMMUNITY

## 2025-01-23 RX ORDER — ATORVASTATIN CALCIUM 40 MG/1
TABLET, FILM COATED ORAL
COMMUNITY

## 2025-01-23 ASSESSMENT — FIBROSIS 4 INDEX: FIB4 SCORE: 0.97

## 2025-01-23 NOTE — PROGRESS NOTES
"                 VASCULAR SURGERY                 Clinic Progress Note  _________________________________    Vitals for Today's Visit (1/23/2025)  Pulse 90   Temp 36.8 °C (98.3 °F) (Temporal)   Ht 1.676 m (5' 6\")   Wt 60.3 kg (133 lb)   SpO2 97%   BMI 21.47 kg/m²   _________________________________    12/17/2024  Patient was evaluated previously as an inpatient for an incidental finding of a left common iliac artery occlusion.  The initial consult note indicates that it was a right common iliac artery occlusion however I confirmed based on imaging that it is the left common iliac artery that is occluded.  This is a relatively unexpected finding given the patient's age of only 49 years old.  He does have a longstanding smoking history.  However the appearance on imaging plus some other associated findings such as renal infarcts raise the possibility that this could have been a thromboembolic event, and also possible consideration that the patient could have an underlying hypercoagulable disorder.  At the time of the initial evaluation the patient was asymptomatic with regard to the left iliac occlusion and he remains asymptomatic with good functional status, no claudication, no rest pain, no tissue loss.  His right JORGITO is normal and his left JORGITO is around 0.8.  He is on Eliquis 5 mg twice daily.  At this point I am recommending hypercoagulable testing to see if we can identify any underlying conditions and the patient will follow-up with me after that to discuss results.  If his hypercoagulable testing is negative, I am not entirely sure if we will need to continue his long-term anticoagulation or not although given the fact that there are multiple findings that could be potentially embolic episodes, we should strongly consider leaving him on anticoagulation indefinitely.         1/23/2025  Patient presents to discuss the results of his hypercoagulable workup.  He has no new symptoms or concerns since our " previous visit.  Functional status is good.  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  Furthermore the patient has multiple family members with a history of recurrent blood clot events including his mother and grandfather although the exact specifics are not entirely clear.  Based on this patient's findings on imaging (which included a left common iliac artery occlusion and infarcts in his kidneys), hyper homocystine level, and a compelling family history, there is no question in my mind that he should plan to stay on oral anticoagulation lifelong to decrease the risk of further thrombotic events.    Patient does have a history of testicular cancer which he was first treated for 20 years ago and then had a recurrence 5 years ago which she has been fully treated for.  He now gets checked every 2 years and things seem to be going okay.  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    Regarding the patient's left iliac artery occlusion, he does not have any significant walking impairment with the left leg.  He does have chronic bilateral foot numbness related to his Guillain-Barré syndrome.  His previous JORGITO was 0.78.  Patient appears to have adequate perfusion of his bilateral lower extremities at this time.    Based on all of this information it does not appear that the patient will be facing any vascular intervention in the near future.  He does have multiple medical issues and compelling evidence for a genetic hypercoagulable condition.  I recommended and placed a referral to the vascular medicine clinic for further evaluation and ongoing surveillance/management of his various conditions.  Patient was agreeable to this.  I have already ordered a surveillance JORGITO study for 1 year from now although I told the patient that if he feels  his lower extremities are developing increasing pain or numbness or difficulty walking these would be symptoms that perhaps his perfusion was getting worse and he should seek reevaluation sooner and he seemed to demonstrate a good understanding of that.    Follow-up with the vascular surgery clinic can be on an as-needed basis if concerns arise.      José Menjivar MD  [facility]  [phone]  [address], [facility] [address]

## 2025-01-30 ENCOUNTER — TELEPHONE (OUTPATIENT)
Dept: HEALTH INFORMATION MANAGEMENT | Facility: OTHER | Age: 50
End: 2025-01-30
Payer: COMMERCIAL

## 2025-03-07 ENCOUNTER — TELEPHONE (OUTPATIENT)
Dept: VASCULAR LAB | Facility: MEDICAL CENTER | Age: 50
End: 2025-03-07
Payer: COMMERCIAL

## 2025-03-07 NOTE — TELEPHONE ENCOUNTER
Called patient, was not able to reach them in regards to New Patient Appointment.    Advised of Location & Time? Yes     Voicemail or MyChart? Vm no MyChart

## 2025-03-13 ENCOUNTER — OFFICE VISIT (OUTPATIENT)
Dept: VASCULAR LAB | Facility: MEDICAL CENTER | Age: 50
End: 2025-03-13
Attending: FAMILY MEDICINE
Payer: COMMERCIAL

## 2025-03-13 VITALS
WEIGHT: 137 LBS | HEIGHT: 66 IN | HEART RATE: 85 BPM | SYSTOLIC BLOOD PRESSURE: 135 MMHG | BODY MASS INDEX: 22.02 KG/M2 | DIASTOLIC BLOOD PRESSURE: 88 MMHG

## 2025-03-13 DIAGNOSIS — Z79.890 LONG-TERM CURRENT USE OF TESTOSTERONE CYPIONATE: ICD-10-CM

## 2025-03-13 DIAGNOSIS — I70.202 ATHEROSCLEROSIS OF ARTERY OF LEFT LOWER EXTREMITY (HCC): Chronic | ICD-10-CM

## 2025-03-13 DIAGNOSIS — Z15.89 HETEROZYGOUS MTHFR MUTATION C677T: Chronic | ICD-10-CM

## 2025-03-13 DIAGNOSIS — G62.9 NEUROPATHY: ICD-10-CM

## 2025-03-13 DIAGNOSIS — E78.49 FCHL (FAMILIAL COMBINED HYPERLIPIDEMIA): ICD-10-CM

## 2025-03-13 DIAGNOSIS — E72.11 HYPERHOMOCYSTEINEMIA (HCC): Chronic | ICD-10-CM

## 2025-03-13 DIAGNOSIS — G61.0 GUILLAIN BARRÉ SYNDROME (HCC): ICD-10-CM

## 2025-03-13 DIAGNOSIS — Z85.47 HISTORY OF TESTICULAR CANCER: ICD-10-CM

## 2025-03-13 DIAGNOSIS — I73.9 PAD (PERIPHERAL ARTERY DISEASE) (HCC): Chronic | ICD-10-CM

## 2025-03-13 DIAGNOSIS — I74.5 ILIAC ARTERY OCCLUSION, LEFT (HCC): Chronic | ICD-10-CM

## 2025-03-13 DIAGNOSIS — E11.51 TYPE 2 DIABETES MELLITUS WITH DIABETIC PERIPHERAL ANGIOPATHY WITHOUT GANGRENE, WITHOUT LONG-TERM CURRENT USE OF INSULIN (HCC): ICD-10-CM

## 2025-03-13 DIAGNOSIS — E29.1 TESTICULAR HYPOFUNCTION: ICD-10-CM

## 2025-03-13 DIAGNOSIS — Z79.01 CHRONIC ANTICOAGULATION: Chronic | ICD-10-CM

## 2025-03-13 DIAGNOSIS — F17.200 TOBACCO USE DISORDER: ICD-10-CM

## 2025-03-13 PROCEDURE — 99212 OFFICE O/P EST SF 10 MIN: CPT

## 2025-03-13 PROCEDURE — 3079F DIAST BP 80-89 MM HG: CPT | Performed by: FAMILY MEDICINE

## 2025-03-13 PROCEDURE — 3075F SYST BP GE 130 - 139MM HG: CPT | Performed by: FAMILY MEDICINE

## 2025-03-13 PROCEDURE — 99214 OFFICE O/P EST MOD 30 MIN: CPT | Performed by: FAMILY MEDICINE

## 2025-03-13 RX ORDER — ATORVASTATIN CALCIUM 40 MG/1
40 TABLET, FILM COATED ORAL DAILY
Qty: 100 TABLET | Refills: 3 | Status: SHIPPED | OUTPATIENT
Start: 2025-03-13

## 2025-03-13 ASSESSMENT — ENCOUNTER SYMPTOMS
SHORTNESS OF BREATH: 0
HEMOPTYSIS: 0
CLAUDICATION: 0
PALPITATIONS: 0
PND: 0
CHILLS: 0
ORTHOPNEA: 0
COUGH: 0
SPUTUM PRODUCTION: 0
WHEEZING: 0
FEVER: 0

## 2025-03-13 ASSESSMENT — FIBROSIS 4 INDEX: FIB4 SCORE: 0.97

## 2025-03-13 NOTE — PROGRESS NOTES
INITIAL VASCULAR MEDICINE CLINIC VISIT   03/13/25     Charanjit Morin Jr. is a 49 y.o. male referred for eval/med mgmt of PAD, est 3/2025  Referring provider: José Menjivar MD    Subjective      LOWER EXTREMITY PAD, chronic L iliac art occlusion:  denies current CLTI including rest pain, nonhealing leg wounds, cold extremities, coloration changes    Pertinent PAD pmxh:  Initial visit hx: admit 11/2024 after incidental finding of L iliac art occlusion on annual surveillance CT scan for hx of testicular CA.  Asymptomatic and denied acute or chronic claudication in the buttock, post thigh or calf, no limits on walking distance.  Was admitted 11/26/24 and consult with [facility] surg who deemed this to be chronic and d/c hep gtts and opted to initiate eliquis BID indefinitely.  Seen as o/p by vasc surg and had hypercoag w/u showing neg FVL, PTGM, APLAs, with moderate high homocysteine and hetero MTHFR variant as only abnl findings.  Deemed no surg candidate due to chronicity and lack of CLTI/ALI symptoms. Today reports stable w/o concerns.  Pending repeat JORGITO 1/2026.   No hx of VTE but strong fhx of DVTs in maternal pedigree.   Tobacco hx: 1/4 ppd from 1ppd, using NRT.   reports that he has been smoking cigarettes. He has a 10 pack-year smoking history. He has never used smokeless tobacco.  Current/ prior treatment:    Prescribed Exercise therapy: No   GDMT: on statin   Surg Interventions: No  Prior back injury: No  Prior dx of neuropathy or neurogenic claudication: No  Had radiation to testicular area about 20yr age due to testicular CA    Other established ASCVD: none     HTN: no prior dx or meds, Home BP log: not checking   HLD: Stable, current treatment: High intensity statin - tolerating, good adherence   Baseline    Latest Reference Range & Units 09/05/24 10:16   Cholesterol,Tot 100 - 199 mg/dL 341 (H)   Triglycerides 0 - 149 mg/dL 518 (H)   HDL >=40 mg/dL 45   LDL <100 mg/dL see below     DYSGLYCEMIA: yes, T2,   prior a1c 17.7 in 9/2024 with gluc 500s  Currently on meds, tolerating     ANTITHROMBOTIC TX: eliquis 5mg BID - no hx of bleeding      Patient Active Problem List    Diagnosis Date Noted    Atherosclerosis of artery of left lower extremity ([facility]) 03/14/2025    Testicular hypofunction 03/13/2025    Long-term current use of testosterone cypionate 03/13/2025    Tobacco use disorder 03/13/2025    History of testicular cancer 03/13/2025    Hyperhomocysteinemia (HCC) 03/13/2025    PAD (peripheral artery disease) ([facility]) 03/13/2025    Chronic anticoagulation 03/13/2025    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[facility]) 03/13/2025    Heterozygous MTHFR mutation C677T 03/13/2025    Iliac artery occlusion, left ([facility]) 11/25/2024    Tobacco abuse counseling 11/25/2024    PVD (peripheral vascular disease) with claudication ([facility]) 11/25/2024    DM (diabetes mellitus) ([facility]) 11/25/2024    Neuropathy 11/25/2024    Malignant tumor of testis ([facility]) 11/22/2020      Past Surgical History:   Procedure Laterality Date    ORCHIECTOMY Right 11/22/2020    Procedure: ORCHIECTOMY - INGUINAL RADICAL;  Surgeon: Arturo Harvey M.D.;  Location: SURGERY [facility];  Service: Urology    ORCHIECTOMY Left       Family History   Problem Relation Age of Onset    Cancer Mother         ovarian cancer    DVT Mother     No Known Problems Father     Cancer Maternal Grandfather         prostate cancer    DVT Maternal Grandfather       Current Outpatient Medications on File Prior to Visit   Medication Sig Dispense Refill    ELIQUIS 5 MG Tab Take 5 mg by mouth 2 times a day.      metFORMIN (GLUCOPHAGE) 500 MG Tab Take 500 mg by mouth 2 times a day.      gabapentin (NEURONTIN) 300 MG Cap Take 300 mg by mouth 2 times a day. May take extra at night PRN      dapagliflozin propanediol (FARXIGA) 10 MG Tab Take 10 mg by mouth every day.      testosterone cypionate (DEPO-TESTOSTERONE) 200 MG/ML injection Inject 200 mg into the shoulder, thigh, or buttocks every 5 days.       No current  "facility-administered medications on file prior to visit.      No Known Allergies     Social History     Tobacco Use    Smoking status: Every Day     Current packs/day: 0.50     Average packs/day: 0.5 packs/day for 20.0 years (10.0 ttl pk-yrs)     Types: Cigarettes    Smokeless tobacco: Never    Tobacco comments:     not recent    Vaping Use    Vaping status: Never Used   Substance Use Topics    Alcohol use: Yes     Comment: 4 drinks per day     Drug use: Yes     Types: Inhaled, Marijuana     Comment: marijuana daily      DIET AND EXERCISE:  Weight Change: stable   Diet: common adult  Exercise: minimal exercise     Review of Systems   Constitutional:  Negative for chills, fever and malaise/fatigue.   Respiratory:  Negative for cough, hemoptysis, sputum production, shortness of breath and wheezing.    Cardiovascular:  Negative for chest pain, palpitations, orthopnea, claudication, leg swelling and PND.          Objective    Vitals:    03/13/25 1031 03/13/25 1035   BP: (!) 144/94 135/88   BP Location: Left arm Left arm   Patient Position: Sitting Sitting   BP Cuff Size: Adult Adult   Pulse: 82 85   Weight: 62.1 kg (137 lb)    Height: 1.676 m (5' 6\")       BP Readings from Last 4 Encounters:   03/13/25 135/88   12/17/24 136/84   11/27/24 117/86   06/22/23 118/64      Body mass index is 22.11 kg/m².   Wt Readings from Last 4 Encounters:   03/13/25 62.1 kg (137 lb)   01/23/25 60.3 kg (133 lb)   12/17/24 59 kg (130 lb)   11/27/24 57.4 kg (126 lb 8.7 oz)      Physical Exam  Constitutional:       General: He is not in acute distress.     Appearance: Normal appearance. He is not diaphoretic.   HENT:      Head: Normocephalic and atraumatic.   Eyes:      Conjunctiva/sclera: Conjunctivae normal.   Cardiovascular:      Rate and Rhythm: Normal rate and regular rhythm.      Pulses:           Radial pulses are 2+ on the right side and 2+ on the left side.        Dorsalis pedis pulses are 2+ on the right side and 1+ on the left " "side.        Posterior tibial pulses are 2+ on the right side and 1+ on the left side.      Heart sounds: Normal heart sounds.      Comments: Distal ext warm, pink.  No wounds, cyanosis or ruborous discoloration  Pulmonary:      Effort: Pulmonary effort is normal.      Breath sounds: Normal breath sounds.   Musculoskeletal:      Right lower leg: No edema.      Left lower leg: No edema.   Skin:     General: Skin is warm and dry.   Neurological:      Mental Status: He is alert and oriented to person, place, and time.      Cranial Nerves: No cranial nerve deficit.      Gait: Gait is intact.   Psychiatric:         Mood and Affect: Mood and affect normal.          DATA REVIEW    Lab Results   Component Value Date/Time    CHOLSTRLTOT 341 (H) 09/05/2024 10:16 AM    LDL see below 09/05/2024 10:16 AM    HDL 45 09/05/2024 10:16 AM    TRIGLYCERIDE 518 (H) 09/05/2024 10:16 AM       Lab Results   Component Value Date/Time    LDL see below 09/05/2024 10:16 AM       No results found for: \"LIPOPROTA\"   No results found for: \"APOB\"   No results found for: \"CRPHIGHSEN\"    Lab Results   Component Value Date/Time    SODIUM 135 11/26/2024 08:05 AM    POTASSIUM 4.1 11/26/2024 08:05 AM    CHLORIDE 101 11/26/2024 08:05 AM    CO2 21 11/26/2024 08:05 AM    GLUCOSE 139 (H) 11/26/2024 08:05 AM    BUN 14 11/26/2024 08:05 AM    CREATININE 0.56 11/26/2024 08:05 AM     Lab Results   Component Value Date/Time    ALKPHOSPHAT 163 (H) 11/25/2024 04:03 PM    ASTSGOT 25 11/25/2024 04:03 PM    ALTSGPT 13 11/25/2024 04:03 PM    TBILIRUBIN 0.3 11/25/2024 04:03 PM       Lab Results   Component Value Date/Time    HBA1C 17.7 (H) 09/05/2024 10:16 AM       No results found for: \"MICROALBCALC\", \"MALBCRT\", \"MALBEXCR\", \"VFXIND72\", \"MICROALBUR\", \"MICRALB\", \"UMICROALBUM\", \"MICROALBTIM\"       Latest Reference Range & Units 01/17/25 15:25   PT 12.0 - 14.6 sec  12.0 - 14.6 sec 12.2  12.5   INR 0.87 - 1.13   0.87 - 1.13  0.91  0.93   APTT 24.7 - 36.0 sec 26.5 "   Fibrinogen 215 - 460 mg/dL 273   Heparin Xa (LMWH) U/mL <0.1   Dilute Waqar Viper Venom Franco 28.0 - 48.0 sec 37.4   Lupus Anticoagulant  Not Present   Factor Ii Dna Analysis Pt Gene  Negative   V Leiden Factor  Negative      Latest Reference Range & Units 25 15:25   Anti-Cardiolipin Ab Iga <=11 APL <10   Anti-Cardiolipin Ab Igm <=12 MPL <10   Anti-Cariolipin Ab Igg <=14 GPL <10   Beta-2 Glycoprotein I Ab Igg <=20 SGU <10   Beta-2 Glycoprotein I Iga <=20 ERLIN <10   Beta-2 Glycoprotein I Igm <=20 SMU <10   Homocysteine <11.00 umol/L 21.02 (H)      Latest Reference Range & Units 25 15:25   Stat C-Reactive Protein 0.00 - 0.75 mg/dL <0.30      Latest Reference Range & Units Most Recent   Testosterone,Total 175 - 781 ng/dL 103 (L)  24 15:45      25 15:25   MTHFR Variant: c.1286A>C Negative   MTHFR Variant: c.665C>T Heterozygous      Latest Reference Range & Units 24 12:14   Insulin Antibody 0.0 - 0.4 U/mL <0.4      Latest Reference Range & Units Most Recent   ALENA Antibody 0.0 - 5.0 IU/mL <5.0  24 12:14         IMAGIN/2022 CT a/p with   AORTA and VASCULATURE: Normal in appearance..     2024 CT c/a/p with   Vasculature:  No aortic aneurysm. The aorta and central pulmonary vessels enhance normally by routine imaging.   Coronary artery calcification: None visible  Adrenals: No adrenal mass evident.  Vasculature: No aortic aneurysm. Prominent aortic calcification again noted. There is occlusion of the left common iliac artery which was not present on 2022. Atherosclerotic calcified plaque noted. The left external and internal iliac artery are   patent as are the common femoral arteries bilaterally. Right iliac artery is patent. Mesenteric vessels enhance grossly normally.    2025 JORGITO   Normal JORGITO on the right   Mild peripheral arterial disease on the left.                  RIGHT      Waveform            Systolic BPs (mmHg)                              120            Brachial   Triphasic                                Common Femoral   Triphasic                                Popliteal   Triphasic                  89            Posterior Tibial   Triphasic                  134           Dorsalis Pedis                                            Digit                              1.12          JORGITO                        LEFT   Waveform        Systolic BPs (mmHg)                                            Brachial   Monophasic                               Common Femoral   Monophasic                               Popliteal   Monophasic                 86            Posterior Tibial   Monophasic                 94            Dorsalis Pedis                                            Digit                              0.78          JORGITO    1/2025 LLE Art duplex    No flow limiting stenosis identified.   Diffuse plaque and multiphasic flow throughout the common femoral,  superficial femoral, and popliteal arteries.    Three vessel runoff to the ankle with monophasic flow.          PROCEDURES:  none         Medical Decision Making:  Today's Assessment / Status / Plan:     1. PAD (peripheral artery disease) (HCC)        2. Iliac artery occlusion, left (HCC)        3. Atherosclerosis of artery of left lower extremity (HCC)        4. Heterozygous MTHFR mutation C677T  VITAMIN B6    VITAMIN B12    FOLATE    METHYLMALONIC ACID, SERUM    HOMOCYSTEINE      5. Hyperhomocysteinemia (HCC)  VITAMIN B6    VITAMIN B12    FOLATE    METHYLMALONIC ACID, SERUM    HOMOCYSTEINE      6. Type 2 diabetes mellitus with diabetic peripheral angiopathy without gangrene, without long-term current use of insulin (HCC)  HEMOGLOBIN A1C    CBC WITHOUT DIFFERENTIAL    Comp Metabolic Panel    Lipid Profile    MICROALBUMIN CREAT RATIO URINE      7. 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HCC)        8. FCHL (familial combined hyperlipidemia)  APOLIPOPROTEIN B    CRP HIGH SENSITIVE (CARDIAC)    LDL, DIRECT    Lipoprotein (a)     "atorvastatin (LIPITOR) 40 MG Tab      9. Neuropathy        10. Tobacco use disorder        11. Long-term current use of testosterone cypionate        12. Testicular hypofunction  TESTOSTERONE, FREE AND TOTAL      13. History of testicular cancer        14. Chronic anticoagulation           Established CVD:     1) LOWER EXTREMITY PAD, (aorto-iliac) -  asymptomatic  - unclear etiology though likely multifactorial related to prior XRT for testicular cancer, smoking, FCHL (with early onset athero changes), hyperhomocysteinemia, testosterone therapy  11/2024 - chronic L iliac art occlusion  1/2025 JORGITO = mod reduced L with monophasic waveforms and diffuse athero plaque w/o major stenoses   - No indications of chronic limb threatening ischemia (CLTI) changes or hx of lifestyle-limiting symptoms   - clearly insidious, chronic onset based upon lack of symptoms or ALI findings in the past with progressive collateralization, does have reduce JORGITO.    - has high risk for progressive symptoms and dz over time, jina if persistent smoking   - PAD \"risk-amplifiers\": ongoing tobacco use  - Imelda class:  I  - Change in Pain-free walking distance: N\A  - Change in Maximum walking distance: N\A  - change in 6MWT: N\A  - as reviewed with patient, evidenced-based standard of care includes risk factor reduction, med mgmt, and exercise therapy with limited need for surgical intervention in majority of patients if there is CLTI or severe lifestyle limiting symptoms after >6mo of standard therapy   Plan:  Lifestyle tx:  - continue lifestyle measures and tobacco cessation and/or complete avoidance strongly encouraged   - start/continue structured exercise therapy  - monitor change in pain-free and total walking distance to monitor progress  - consider 6MWT for formal progress measurement   Med tx:  - continue intensive med mgmt   - see antithrombotic plan below  - if sx develop, then consider cilostazol 50 or 100mg BID (no hx of CHF) " "  Imaging/surveillance   - check annual JORGITO/art duplex, ao/iliac duplex (qJan)   - consider CTA Ao with run-off if progressive sx  Other tx:  - has established with vasc surg (Dr. Menjivar)    HYPERHOMOCYSTEINEMIA without homocystinuria with hetero MTHFR variant - stable, no hx of VTE but has PAD, moderate elevation baseline = 21.    - remains unclear based upon above evidence if this is major contributor to his current ililac occlusive dz, that being said we will continue use of OAC, as noted below, due to the multitude of atherothrombotic conditions pt has   Heterozygous MTHFR mutation  - As reviewed with patient, MTHFR mutation is, generally, not considered consequential in decision-making as these mutations have a high population-wide prevalence and are no longer considered a risk for major ASCVD or VTE based upon available evidence and should no longer be included in thrombophilia testing.    Consensus guidelines and expert opinions:  1) \"Homocysteine, MTHFR variants, and VANGIE-1 4G/5G promoter variants -- There is no clinical rationale for measurement of fasting plasma homocysteine levels or for assaying for presence of the methylenetetrahydrofolate reductase (MTHFR) 677C-->T, MTHFR 1298 A-->C, or plasminogen activator inhibitor-1 (VANGIE-1) 4G/5G promoter variants in asymptomatic individuals, and we never order these tests when evaluating patients with venous or arterial thrombosis\" (Screening for inherited thrombophilia in asymptomatic adults, [email or web address], accessed 4/28/23)  2) There is no indication for MTHFR mutation testing in routine clinical practice in any patient group. In 2013, the American College of Medical Genetics recommended that MTHFR genetic testing should not be ordered as part of the clinical evaluation for risk of blood clots or recurrent pregnancy loss. (Circulation. 2015;132:e6-e9. DOI: 10.1161/CIRCULATIONAHA.114.329640; Ronan SE, Shook CJ, Mari COLINDRES. ACMG Practice Guideline: lack of " evidence for MTHFR polymorphism testing. Valeria Med. 2013;15:153-156.)   3) Limited role for treatment - Despite some limitations, clinical trials have generally found that reducing levels of homocysteine with B vitamin supplementation does not prevent cardiovascular disease or reduce the incidence of recurrent venous thromboembolism (VTE) or arterial thrombosis. Thus, we suggest not testing for or treating hyperhomocysteinemia (Grade 2B), unless homocystinuria is suspected or confirmed (Overview of homocysteine, [email or web address], accessed 4/28/23)  Additional evidence:   1) MTHFR mutation, even dual heterozygosity or homozygsity, has not been conclusively shown to contribute to CAD or VTE.  Largest RCTs evaluating rate of recurrent VTE in pts with hetero MTHFR after 1st VTE event demonstrate no difference in rates.    - In this large pooled analysis, inclusive of large studies like SONDRA, no effect was found for C677T MTHFR on VTE  2) The homozygous variant of C677T MTHFR ranges from less than 1% among  Americans to 20% and more among some  populations and Hispanics.  populations have a prevalence of around 11% (Eur J Epidemiol. 2013 Aug; 28(8): 621-647.)  Plan:  - will assess vit B levels and homocysteine  - unfortunately we do not have methionine and CBS testing available through [facility]   - could consider B vit therapy, though unclear if beneficial     LIPID MANAGEMENT   Presumed dx:  FCHL based upon high LDL-C / high TG   - most commonly inherited DLD (~1 in 200), generally polygenic often with significant famhx of DLD and premature ASCVD (as in his case with PAD)  significant increased risk for premature CVD (>20% develop CHD <61yo) and requires aggressive lipid lowering interventions including diet and meds   Qualifies for Statin Therapy per ACC/AHA Guidelines: yes, Secondary Prevention - Very high risk ASCVD - 2 major events or 1 event with other high-risk conditions  Major ASCVD events:  "Symptomatic PAD (hx of claudication with JORGITO <0.85, previous revascularization/amputation)    High-risk conditions: Hypertension  and Current smoking   Lipoprotein(a): Ordered this visit  Currently on Statin: Yes  Tx goals: due to HTG >200, use apoB <60 and/or non-HDL-C <85   At goal?  no  Plan:   - update labs   - continue atorva 40mg daily   - if TG >500, then start icosapent due to established ascvd with high risk features   - consider zetia vs [id number] as additional agents      BLOOD PRESSURE CONTROL   Office BP goal per ACC/AHA <130/80  Home BP at goal:  n/a  Office BP at goal:  no - residual IDH  24h ABPM:  not ordered to date   RDN candidate? YES  Contributing factors: tobacco   Plan:   - start/continue home BP monitoring, reviewed correct technique, provide BP log and instructions  - order 24h ABPM:  UNDECIDED  - routine monitoring of lytes/gfr   Medications:  - initiate RAASi agent as initial agent for HTN in PAD pts, prefer telmisartan in light of T2D, MetS    GLYCEMIC MANAGEMENT Diabetic, T2 confirmed by testing with PAD  Goal A1c < 7.0  Lab Results   Component Value Date/Time    HBA1C 17.7 (H) 09/05/2024 10:16 AM       No results found for: \"MICROALBCALC\", \"MALBCRT\", \"MALBEXCR\", \"XGMKEV82\", \"MICROALBUR\", \"MICRALB\", \"UMICROALBUM\", \"MICROALBTIM\"  Plan:  - continue current medication plan   - recommmend for routine care with PCP (or endocrine) to include regular A1c monitoring, annual albumin/creatinine ratio (ACR), annual diabetic retinopathy screening, foot exams, annual flu vaccine, and updates to pneumonia vaccines as appropriate      ANTITHROMBOTIC THERAPY:   - concur with DR. Menjivar to continue indefinite OAC, will use eliquis 5mg BID though likely could benefit from ASA + xarelto 2.5mg BID as per 2024 PAD guidelines    LIFESTYLE INTERVENTIONS  TOBACCO: Stages of Change: Contemplative (intention to change in next 6 months )   reports that he has been smoking cigarettes. He has a 10 pack-year smoking " history. He has never used smokeless tobacco.   - Provided strong recommendation for complete cessation and informed this is the primary contributor to the majority of all ASCVD and cancer-related conditions and can result in significant morbidity and early mortality.   - reviewed resources for cessation including tobacco cessation clinic visit, pharmacotx meds, quit lines  - review at every visit   Provided 2 minutes of face-to-face counseling on above topics     PHYSICAL ACTIVITY: structured, regular exercise essential to tx, rec for >150min/week of mod-intensity activity or as much as tolerated    NUTRITION: Mediterranean and reduce Na to <2,300mg/day      ETOH: to limit to occasional social use    WT MGMT: maintain healthy weight (reduction 1kg = reduction 1mmHg BP)        OTHER:     # GBS - acute onset 2023, with chronic periph neuropathy  - has been using IVIG in 2023     # testicular cancer, s/p surgery, XRT.  Currently on TRT   - ongoing care with urology     # testicular hypofunction on TRT   - reviewed latest CV safety from TRAVERSE trial indicating likely CV safety with use of topical testosterone gel 1.62% if maintain total T between 350-750 ng/dl, acknowledging there was a 61% treatment discontinuation rate in both treatment and placebo groups and 21% trial withdrawal rate that could potentially bias study results/conclusions. Additionally, advised pt these findings cannot be extrapolated to men using other testosterone formulations or whom exceed >750 ng/dl.  Statistically significantly higher risk for AF, PE, and LAUREL remained in treatment group compared to placebo group  Plan  - defer mgmt and surveillance to prescriber, and highly encouraged to review the above data and trial with prescriber for continued informed shared decision-making on benefits/risks for TRT      STUDIES: 1/2026 JORGITO/art duplex (ordered by vasc surg) RN to track   LABS: as noted above  FOLLOW-UP: 2 months     Pedro Castro,  M.D.   [facility]  [facility]  [phone]

## 2025-03-14 PROBLEM — I70.202 ATHEROSCLEROSIS OF ARTERY OF LEFT LOWER EXTREMITY (HCC): Status: ACTIVE | Noted: 2025-03-14

## 2025-05-15 ENCOUNTER — APPOINTMENT (OUTPATIENT)
Dept: VASCULAR LAB | Facility: MEDICAL CENTER | Age: 50
End: 2025-05-15
Attending: FAMILY MEDICINE
Payer: COMMERCIAL

## (undated) DEVICE — KIT ANESTHESIA W/CIRCUIT & 3/LT BAG W/FILTER (20EA/CA)

## (undated) DEVICE — BLADE SURGICAL #15 - (50/BX 3BX/CA)

## (undated) DEVICE — MASK ANESTHESIA ADULT  - (100/CA)

## (undated) DEVICE — GLOVE BIOGEL SZ 7.5 SURGICAL PF LTX - (50PR/BX 4BX/CA)

## (undated) DEVICE — ELECTRODE 850 FOAM ADHESIVE - HYDROGEL RADIOTRNSPRNT (50/PK)

## (undated) DEVICE — PROTECTOR ULNA NERVE - (36PR/CA)

## (undated) DEVICE — GLOVE COTTON STERILE (50/CA)

## (undated) DEVICE — GOWN WARMING STANDARD FLEX - (30/CA)

## (undated) DEVICE — NEPTUNE 4 PORT MANIFOLD - (20/PK)

## (undated) DEVICE — STERI STRIP COMPOUND BENZOIN - TINCTURE 0.6ML WITH APPLICATOR (40EA/BX)

## (undated) DEVICE — TRAY SKIN SCRUB PVP WET (20EA/CA) PART #DYND70356 DISCONTINUED

## (undated) DEVICE — DRAIN PENROSE 3/8 IN X 12 IN - STERILE (25EA/BX)

## (undated) DEVICE — TUBING CLEARLINK DUO-VENT - C-FLO (48EA/CA)

## (undated) DEVICE — SHEET PEDIATRIC LAPAROTOMY - (10/CA)

## (undated) DEVICE — LACTATED RINGERS INJ 1000 ML - (14EA/CA 60CA/PF)

## (undated) DEVICE — ATHLETIC SUPPORTER LARGE - (1/EA)

## (undated) DEVICE — ELECTRODE DUAL RETURN W/ CORD - (50/PK)

## (undated) DEVICE — HEAD HOLDER JUNIOR/ADULT

## (undated) DEVICE — SUTURE 2-0 VICRYL PLUS SH - 27 INCH (36/BX)

## (undated) DEVICE — SENSOR SPO2 NEO LNCS ADHESIVE (20/BX) SEE USER NOTES

## (undated) DEVICE — GAUZE FLUFF STERILE 2-PLY 36 X 36 (100EA/CA)

## (undated) DEVICE — SUTURE 2-0 SILK 12 X 18" (36PK/BX)"

## (undated) DEVICE — CLOSURE SKIN STRIP 1/2 X 4 IN - (STERI STRIP) (50/BX 4BX/CA)

## (undated) DEVICE — SUTURE 3-0 VICRYL PLUS SH - 27 INCH (36/BX)

## (undated) DEVICE — NEEDLE NON SAFETY 25 GA X 1 1/2 IN HYPO (100EA/BX)

## (undated) DEVICE — SUTURE 3-0 CHROMIC GUT SH 27 (36PK/BX)"

## (undated) DEVICE — BOVIE NEEDLE TIP INSULATD NON-SAFETY 2CM (50/PK)

## (undated) DEVICE — DERMABOND ADVANCED - (12EA/BX)

## (undated) DEVICE — SUTURE GENERAL

## (undated) DEVICE — SODIUM CHL IRRIGATION 0.9% 1000ML (12EA/CA)

## (undated) DEVICE — GLOVE SZ 7 BIOGEL PI MICRO - PF LF (50PR/BX 4BX/CA)

## (undated) DEVICE — SPONGE PEANUT - (5/PK 50PK/CA)

## (undated) DEVICE — SUCTION INSTRUMENT YANKAUER BULBOUS TIP W/O VENT (50EA/CA)

## (undated) DEVICE — GLOVE BIOGEL PI INDICATOR SZ 7.5 SURGICAL PF LF -(50/BX 4BX/CA)

## (undated) DEVICE — SET LEADWIRE 5 LEAD BEDSIDE DISPOSABLE ECG (1SET OF 5/EA)

## (undated) DEVICE — SLEEVE, VASO, THIGH, MED

## (undated) DEVICE — SET EXTENSION WITH 2 PORTS (48EA/CA) ***PART #2C8610 IS A SUBSTITUTE*****

## (undated) DEVICE — CANISTER SUCTION 3000ML MECHANICAL FILTER AUTO SHUTOFF MEDI-VAC NONSTERILE LF DISP  (40EA/CA)

## (undated) DEVICE — TOWELS CLOTH SURGICAL - (4/PK 20PK/CA)

## (undated) DEVICE — DRAPE LAPAROTOMY T SHEET - (12EA/CA)

## (undated) DEVICE — SYRINGE 10 ML CONTROL LL (25EA/BX 4BX/CA)

## (undated) DEVICE — PACK MINOR BASIN - (2EA/CA)